# Patient Record
Sex: FEMALE | Race: WHITE | NOT HISPANIC OR LATINO | Employment: FULL TIME | URBAN - METROPOLITAN AREA
[De-identification: names, ages, dates, MRNs, and addresses within clinical notes are randomized per-mention and may not be internally consistent; named-entity substitution may affect disease eponyms.]

---

## 2017-04-18 DIAGNOSIS — N21.1 URETHRAL CALCULUS: Primary | ICD-10-CM

## 2017-04-19 ENCOUNTER — HOSPITAL ENCOUNTER (OUTPATIENT)
Dept: GENERAL RADIOLOGY | Facility: CLINIC | Age: 57
Discharge: HOME | End: 2017-04-19
Attending: UROLOGY | Admitting: UROLOGY
Payer: COMMERCIAL

## 2017-04-19 ENCOUNTER — OFFICE VISIT (OUTPATIENT)
Dept: UROLOGY | Facility: CLINIC | Age: 57
End: 2017-04-19
Payer: COMMERCIAL

## 2017-04-19 VITALS — HEIGHT: 71 IN | HEART RATE: 60 BPM | WEIGHT: 203 LBS | OXYGEN SATURATION: 98 % | BODY MASS INDEX: 28.42 KG/M2

## 2017-04-19 DIAGNOSIS — N21.1 URETHRAL CALCULUS: ICD-10-CM

## 2017-04-19 DIAGNOSIS — N20.1 URETERAL STONE: Primary | ICD-10-CM

## 2017-04-19 LAB
ALBUMIN UR-MCNC: NEGATIVE MG/DL
APPEARANCE UR: CLEAR
BILIRUB UR QL STRIP: NEGATIVE
COLOR UR AUTO: YELLOW
GLUCOSE UR STRIP-MCNC: NEGATIVE MG/DL
HGB UR QL STRIP: NEGATIVE
KETONES UR STRIP-MCNC: NEGATIVE MG/DL
LEUKOCYTE ESTERASE UR QL STRIP: NEGATIVE
NITRATE UR QL: NEGATIVE
PH UR STRIP: 8.5 PH (ref 5–7)
SP GR UR STRIP: 1.01 (ref 1–1.03)
URN SPEC COLLECT METH UR: ABNORMAL
UROBILINOGEN UR STRIP-ACNC: 0.2 EU/DL (ref 0.2–1)

## 2017-04-19 PROCEDURE — 74010 XR KUB: CPT | Mod: 52

## 2017-04-19 PROCEDURE — 99212 OFFICE O/P EST SF 10 MIN: CPT | Performed by: UROLOGY

## 2017-04-19 PROCEDURE — 81003 URINALYSIS AUTO W/O SCOPE: CPT | Performed by: UROLOGY

## 2017-04-19 ASSESSMENT — PAIN SCALES - GENERAL: PAINLEVEL: NO PAIN (0)

## 2017-04-19 NOTE — PROGRESS NOTES
July Wolf is a 56-year-old female with a history of calcium/uric acid stones. She has a normal urinalysis today with pH 8.5, specific gravity 1.015, no blood or leukocytes. She's having no flank pain or hematuria  Creatinine last fall was 1.14 and she's had normal serum calcium levels.  Other past medical history: Hyperlipidemia, hysterectomy, tonsillectomy, nonsmoker  Family history: Diabetes, heart disease, prostate cancer  Urinalysis as above  Exam: Normal appearance, normal vital signs, alert and oriented, normocephalic, normal respirations, neuro grossly intact  Assessment: History of calcium/uric acid urolithiasis-no evidence for recurrence on KUB or symptoms  Plan repeat KUB in 1 year-if clear no further follow-up

## 2017-04-19 NOTE — MR AVS SNAPSHOT
After Visit Summary   4/19/2017    July Wolf    MRN: [id number]           Patient Information     Date Of Birth          1960        Visit Information        Provider Department      4/19/2017 2:00 PM Eduardo Khalil MD [facility]        Today's Diagnoses     Ureteral stone    -  1       Follow-ups after your visit        Follow-up notes from your care team     Return in about 1 year (around 4/19/2018) for KUB.      Future tests that were ordered for you today     Open Future Orders        Priority Expected Expires Ordered    XR KUB [[id number]] Routine 4/19/2018 4/19/2018 4/19/2017    XR KUB [[id number]] Routine 4/18/2017 4/18/2018 4/18/2017            Who to contact     If you have questions or need follow up information about today's clinic visit or your schedule please contact [facility] UROLOGY [facility] directly at [phone].  Normal or non-critical lab and imaging results will be communicated to you by [employer]ell[employer], letter or phone within 4 business days after the clinic has received the results. If you do not hear from us within 7 days, please contact the clinic through [employer] or phone. If you have a critical or abnormal lab result, we will notify you by phone as soon as possible.  Submit refill requests through [employer] or call your pharmacy and they will forward the refill request to us. Please allow 3 business days for your refill to be completed.          Additional Information About Your Visit        MyChart Information     [employer] gives you secure access to your electronic health record. If you see a primary care provider, you can also send messages to your care team and make appointments. If you have questions, please call your primary care clinic.  If you do not have a primary care provider, please call [phone] and they will assist you.        Care EveryWhere ID     This is your Care EveryWhere ID. This  "could be used by other organizations to access your [place] medical records  [id number]        Your Vitals Were     Pulse Height Pulse Oximetry BMI (Body Mass Index)          60 1.803 m (5' 11\") 98% 28.31 kg/m2         Blood Pressure from Last 3 Encounters:   10/19/16 124/84   10/04/16 120/70   09/29/16 111/72    Weight from Last 3 Encounters:   04/19/17 92.1 kg (203 lb)   10/19/16 92.1 kg (203 lb)   10/04/16 92.1 kg (203 lb)              We Performed the Following     UA without Microscopic        Primary Care Provider Office Phone # Fax #    Isha Demarco [phone]       [facility] [address] [place] [address]        Thank you!     Thank you for choosing [facility]  [facility]. Our goal is always to provide you with excellent care. Hearing back from our patients is one way we can continue to improve our services. Please take a few minutes to complete the written survey that you may receive in the mail after your visit with us. Thank you!             Your Updated Medication List - Protect others around you: Learn how to safely use, store and throw away your medicines at www.disposemymeds.org.      Notice  As of 4/19/2017  2:20 PM    You have not been prescribed any medications.      "

## 2017-04-19 NOTE — LETTER
4/19/2017       RE: July Wolf  [address] [place] [address]     Dear Colleague,    Thank you for referring your patient, July Wolf, to the [facility] UROLOGY CL[facility]IC [place] [facility]. Please see a copy of my visit note below.    July Wolf is a 56-year-old female with a history of calcium/uric acid stones. She has a normal urinalysis today with pH 8.5, specific gravity 1.015, no blood or leukocytes. She's having no flank pain or hematuria  Creatinine last fall was 1.14 and she's had normal serum calcium levels.  Other past medical history: Hyperlipidemia, hysterectomy, tonsillectomy, nonsmoker  Family history: Diabetes, heart disease, prostate cancer  Urinalysis as above  Exam: Normal appearance, normal vital signs, alert and oriented, normocephalic, normal respirations, neuro grossly intact  Assessment: History of calcium/uric acid urolithiasis-no evidence for recurrence on KUB or symptoms  Plan repeat KUB in 1 year-if clear no further follow-up      Again, thank you for allowing me to participate in the care of your patient.      Sincerely,    Eduardo Khalil MD

## 2019-09-27 ENCOUNTER — HEALTH MAINTENANCE LETTER (OUTPATIENT)
Age: 59
End: 2019-09-27

## 2019-10-22 ENCOUNTER — HOSPITAL ENCOUNTER (EMERGENCY)
Facility: CLINIC | Age: 59
Discharge: HOME | End: 2019-10-22
Attending: EMERGENCY MEDICINE | Admitting: EMERGENCY MEDICINE
Payer: COMMERCIAL

## 2019-10-22 VITALS
HEART RATE: 71 BPM | RESPIRATION RATE: 18 BRPM | TEMPERATURE: 97.7 F | SYSTOLIC BLOOD PRESSURE: 164 MMHG | DIASTOLIC BLOOD PRESSURE: 89 MMHG | OXYGEN SATURATION: 97 %

## 2019-10-22 DIAGNOSIS — T78.40XA ALLERGIC REACTION, INITIAL ENCOUNTER: ICD-10-CM

## 2019-10-22 PROCEDURE — 96374 THER/PROPH/DIAG INJ IV PUSH: CPT

## 2019-10-22 PROCEDURE — 25000128 H RX IP 250 OP 636: Performed by: EMERGENCY MEDICINE

## 2019-10-22 PROCEDURE — 99284 EMERGENCY DEPT VISIT MOD MDM: CPT | Mod: 25

## 2019-10-22 PROCEDURE — 25000132 ZZH RX MED GY IP 250 OP 250 PS 637: Performed by: EMERGENCY MEDICINE

## 2019-10-22 RX ORDER — ACETAMINOPHEN 325 MG/1
650 TABLET ORAL ONCE
Status: COMPLETED | OUTPATIENT
Start: 2019-10-22 | End: 2019-10-22

## 2019-10-22 RX ORDER — EPINEPHRINE 0.3 MG/.3ML
0.3 INJECTION SUBCUTANEOUS
Qty: 1 EACH | Refills: 0 | Status: ON HOLD | OUTPATIENT
Start: 2019-10-22 | End: 2022-07-27

## 2019-10-22 RX ORDER — DIPHENHYDRAMINE HCL 25 MG
25 TABLET ORAL EVERY 6 HOURS PRN
Qty: 12 TABLET | Refills: 0 | Status: SHIPPED | OUTPATIENT
Start: 2019-10-22 | End: 2022-07-20

## 2019-10-22 RX ORDER — DEXAMETHASONE SODIUM PHOSPHATE 10 MG/ML
10 INJECTION, SOLUTION INTRAMUSCULAR; INTRAVENOUS ONCE
Status: COMPLETED | OUTPATIENT
Start: 2019-10-22 | End: 2019-10-22

## 2019-10-22 RX ADMIN — ACETAMINOPHEN 650 MG: 325 TABLET, FILM COATED ORAL at 13:12

## 2019-10-22 RX ADMIN — DEXAMETHASONE SODIUM PHOSPHATE 10 MG: 10 INJECTION, SOLUTION INTRAMUSCULAR; INTRAVENOUS at 13:12

## 2019-10-22 ASSESSMENT — ENCOUNTER SYMPTOMS
FACIAL SWELLING: 0
SHORTNESS OF BREATH: 0
ROS SKIN COMMENTS: ITCHING
FEVER: 0

## 2019-10-22 NOTE — ED PROVIDER NOTES
"  History     Chief Complaint:  Allergic Reaction    HPI   July Wolf is a 58 year old female with a history of hyperlipidemia who presents to the emergency department today for evaluation of concerns for an allergic reaction. The patient explains that she was nearly finished eating a salad for lunch around 1200 when she developed warmth described as \"more than just a hot flash\" and itchiness to her chest or back. This prompted a call to EMS, who provided 50 mg benadryl IV with marked relief. Here the patient denies any tongue swelling or known new exposures in her salad.     Allergies:  No Known Drug Allergies     Medications:    Medications reviewed. No pertinent medications.    Past Medical History:    Urolithiasis  Occipital mass  Hyperlipidemia    Past Surgical History:    Combined cystoscopy, retrogrades, ureteroscopy, insert stent  Genitourinary surgery  Tonsillectomy  Hysterectomy   section x3    Family History:    Father: prostate cancer  Mother: lipids  Brother: lipids, heart disease, diabetes  Sister: lipids    Social History:  The patient was accompanied to the ED by her daughter.  Smoking Status: Never Smoker  Alcohol Use: Positive  Drug Use: Negative  PCP: Isha Demarco  Marital Status:        Review of Systems   Constitutional: Negative for fever.        Warmth    HENT: Negative for facial swelling.         No tongue swelling   Respiratory: Negative for shortness of breath.    Skin:        Itching    All other systems reviewed and are negative.      Physical Exam     Patient Vitals for the past 24 hrs:   BP Temp Temp src Pulse Resp SpO2   10/22/19 1235 (!) 176/90 97.7  F (36.5  C) Oral -- -- --   10/22/19 1234 -- -- Oral 70 18 98 %     Physical Exam  Nursing note and vitals reviewed.  Constitutional: Well nourished.   Eyes: Conjunctiva normal.  Pupils are equal, round, and reactive to light.   ENT: Nose normal. Mucous membranes pink and moist.    Neck: Normal range of " motion.  CVS: Normal rate, regular rhythm.  Normal heart sounds.  No murmur.  Pulmonary: Lungs clear to auscultation bilaterally. No wheezes/rales/rhonchi.  GI: Abdomen soft. Nontender, nondistended. No rigidity or guarding.    MSK: No calf tenderness or swelling.  Neuro: Alert. Follows simple commands.  Skin: Skin is warm and dry. No rash noted.   Psychiatric: Normal affect.       Emergency Department Course     Interventions:  1312 Decadron 10 mg IV  1312 Tylenol 650 mg Oral    Emergency Department Course:    1232 Nursing notes and vitals reviewed.    1251 I performed an exam of the patient as documented above.     1359 Patient rechecked and updated.      Findings and plan explained to the Patient. Patient discharged home with instructions regarding supportive care, medications, and reasons to return. The importance of close follow-up was reviewed. The patient was prescribed an epipen and benadryl.     Impression & Plan      Medical Decision Making:  July Wolf is a 58 year old female who presents with complaint of concerns for allergic reaction.  The patient has no signs of airway compromise or anaphylaxis.  There are no new exposures to suggest a specific allergen.  The patient was treated with benadryl prior to arrival and given decadron here given concern for allergic response.  She was observed over an hour without signs of worsening clinical status and I believe the patient is safe for discharge home.  I discharged with prescriptions for benadryl, and epipen should she develop signs of anaphylaxis.  Recommended follow-up with PCP in 1-2 days for persistent symptoms and given precautions to return to ED should symptoms worsen/change.      Diagnosis:    ICD-10-CM    1. Allergic reaction, initial encounter T78.40XA      Disposition:   The patient is discharged to home.    Discharge Medications:  New Prescriptions    DIPHENHYDRAMINE (BENADRYL) 25 MG TABLET    Take 1 tablet (25 mg) by mouth every 6 hours as  needed for itching or allergies    EPINEPHRINE (EPIPEN/ADRENACLICK/OR ANY BX GENERIC EQUIV) 0.3 MG/0.3ML INJECTION 2-PACK    Inject 0.3 mLs (0.3 mg) into the muscle once as needed for anaphylaxis     Scribe Disclosure:  I, Lou Eaton, am serving as a scribe at 12:38 PM on 10/22/2019 to document services personally performed by Kaye Benito DO based on my observations and the provider's statements to me.    [facility] EMERGENCY DEPARTMENT       Kaye Benito DO  10/22/19 0315

## 2019-10-22 NOTE — ED TRIAGE NOTES
Patient presents to the ED following an allergic reaction. Patient was eating lunch when suddenly became very flushed and itchy. Denies SOB, airway or mouth involvement. Denies history of previous allergic reactions.

## 2019-10-22 NOTE — ED AVS SNAPSHOT
[facility] Emergency Department  [address]  [facility] [address]  Phone:  [phone]  Fax:  [phone]                                    July Wolf   MRN: [id number]    Department:  [facility] Emergency Department   Date of Visit:  10/22/2019           After Visit Summary Signature Page    I have received my discharge instructions, and my questions have been answered. I have discussed any challenges I see with this plan with the nurse or doctor.    ..........................................................................................................................................  Patient/Patient Representative Signature      ..........................................................................................................................................  Patient Representative Print Name and Relationship to Patient    ..................................................               ................................................  Date                                   Time    ..........................................................................................................................................  Reviewed by Signature/Title    ...................................................              ..............................................  Date                                               Time          22EPIC Rev 08/18

## 2019-11-01 ENCOUNTER — MEDICAL CORRESPONDENCE (OUTPATIENT)
Dept: HEALTH INFORMATION MANAGEMENT | Facility: CLINIC | Age: 59
End: 2019-11-01

## 2019-11-12 ENCOUNTER — HOSPITAL ENCOUNTER (OUTPATIENT)
Dept: CARDIOLOGY | Facility: CLINIC | Age: 59
Discharge: HOME | End: 2019-11-12
Attending: NURSE PRACTITIONER | Admitting: NURSE PRACTITIONER
Payer: COMMERCIAL

## 2019-11-12 DIAGNOSIS — R00.2 PALPITATIONS: ICD-10-CM

## 2019-11-12 DIAGNOSIS — Z82.49 FAMILY HISTORY OF CORONARY ARTERY DISEASE: ICD-10-CM

## 2019-11-12 PROCEDURE — 93018 CV STRESS TEST I&R ONLY: CPT | Performed by: INTERNAL MEDICINE

## 2019-11-12 PROCEDURE — 93321 DOPPLER ECHO F-UP/LMTD STD: CPT | Mod: 26 | Performed by: INTERNAL MEDICINE

## 2019-11-12 PROCEDURE — 93350 STRESS TTE ONLY: CPT | Mod: 26 | Performed by: INTERNAL MEDICINE

## 2019-11-12 PROCEDURE — 93325 DOPPLER ECHO COLOR FLOW MAPG: CPT | Mod: TC

## 2019-11-12 PROCEDURE — 93016 CV STRESS TEST SUPVJ ONLY: CPT | Performed by: INTERNAL MEDICINE

## 2019-11-12 PROCEDURE — 93325 DOPPLER ECHO COLOR FLOW MAPG: CPT | Mod: 26 | Performed by: INTERNAL MEDICINE

## 2021-01-09 ENCOUNTER — HEALTH MAINTENANCE LETTER (OUTPATIENT)
Age: 61
End: 2021-01-09

## 2021-06-30 ENCOUNTER — OFFICE VISIT (OUTPATIENT)
Dept: URGENT CARE | Facility: URGENT CARE | Age: 61
End: 2021-06-30
Payer: COMMERCIAL

## 2021-06-30 ENCOUNTER — ANCILLARY PROCEDURE (OUTPATIENT)
Dept: GENERAL RADIOLOGY | Facility: CLINIC | Age: 61
End: 2021-06-30
Attending: PHYSICIAN ASSISTANT
Payer: COMMERCIAL

## 2021-06-30 VITALS
DIASTOLIC BLOOD PRESSURE: 69 MMHG | OXYGEN SATURATION: 100 % | HEART RATE: 67 BPM | RESPIRATION RATE: 18 BRPM | TEMPERATURE: 98.8 F | SYSTOLIC BLOOD PRESSURE: 128 MMHG

## 2021-06-30 DIAGNOSIS — R10.9 FLANK PAIN: Primary | ICD-10-CM

## 2021-06-30 DIAGNOSIS — R30.0 DYSURIA: ICD-10-CM

## 2021-06-30 LAB
ALBUMIN UR-MCNC: NEGATIVE MG/DL
ANION GAP SERPL CALCULATED.3IONS-SCNC: <1 MMOL/L (ref 3–14)
APPEARANCE UR: CLEAR
BACTERIA #/AREA URNS HPF: ABNORMAL /HPF
BASOPHILS # BLD AUTO: 0 10E9/L (ref 0–0.2)
BASOPHILS NFR BLD AUTO: 0.2 %
BILIRUB UR QL STRIP: NEGATIVE
BUN SERPL-MCNC: 12 MG/DL (ref 7–30)
CALCIUM SERPL-MCNC: 9.6 MG/DL (ref 8.5–10.1)
CHLORIDE SERPL-SCNC: 108 MMOL/L (ref 94–109)
CO2 SERPL-SCNC: 30 MMOL/L (ref 20–32)
COLOR UR AUTO: YELLOW
CREAT SERPL-MCNC: 1 MG/DL (ref 0.52–1.04)
DIFFERENTIAL METHOD BLD: NORMAL
EOSINOPHIL # BLD AUTO: 0.1 10E9/L (ref 0–0.7)
EOSINOPHIL NFR BLD AUTO: 1.4 %
ERYTHROCYTE [DISTWIDTH] IN BLOOD BY AUTOMATED COUNT: 11.9 % (ref 10–15)
GFR SERPL CREATININE-BSD FRML MDRD: 60 ML/MIN/{1.73_M2}
GLUCOSE SERPL-MCNC: 96 MG/DL (ref 70–99)
GLUCOSE UR STRIP-MCNC: NEGATIVE MG/DL
HCT VFR BLD AUTO: 42.8 % (ref 35–47)
HGB BLD-MCNC: 14.3 G/DL (ref 11.7–15.7)
HGB UR QL STRIP: ABNORMAL
KETONES UR STRIP-MCNC: NEGATIVE MG/DL
LEUKOCYTE ESTERASE UR QL STRIP: NEGATIVE
LYMPHOCYTES # BLD AUTO: 0.9 10E9/L (ref 0.8–5.3)
LYMPHOCYTES NFR BLD AUTO: 21.2 %
MCH RBC QN AUTO: 32.2 PG (ref 26.5–33)
MCHC RBC AUTO-ENTMCNC: 33.4 G/DL (ref 31.5–36.5)
MCV RBC AUTO: 96 FL (ref 78–100)
MONOCYTES # BLD AUTO: 0.4 10E9/L (ref 0–1.3)
MONOCYTES NFR BLD AUTO: 8.1 %
NEUTROPHILS # BLD AUTO: 3.1 10E9/L (ref 1.6–8.3)
NEUTROPHILS NFR BLD AUTO: 69.1 %
NITRATE UR QL: NEGATIVE
NON-SQ EPI CELLS #/AREA URNS LPF: ABNORMAL /LPF
PH UR STRIP: 5 PH (ref 5–7)
PLATELET # BLD AUTO: 179 10E9/L (ref 150–450)
POTASSIUM SERPL-SCNC: 4.1 MMOL/L (ref 3.4–5.3)
RBC # BLD AUTO: 4.44 10E12/L (ref 3.8–5.2)
RBC #/AREA URNS AUTO: ABNORMAL /HPF
SODIUM SERPL-SCNC: 138 MMOL/L (ref 133–144)
SOURCE: ABNORMAL
SP GR UR STRIP: >1.03 (ref 1–1.03)
UROBILINOGEN UR STRIP-ACNC: 0.2 EU/DL (ref 0.2–1)
WBC # BLD AUTO: 4.4 10E9/L (ref 4–11)
WBC #/AREA URNS AUTO: ABNORMAL /HPF

## 2021-06-30 PROCEDURE — 36415 COLL VENOUS BLD VENIPUNCTURE: CPT | Performed by: PHYSICIAN ASSISTANT

## 2021-06-30 PROCEDURE — 87086 URINE CULTURE/COLONY COUNT: CPT | Performed by: PHYSICIAN ASSISTANT

## 2021-06-30 PROCEDURE — 85025 COMPLETE CBC W/AUTO DIFF WBC: CPT | Performed by: PHYSICIAN ASSISTANT

## 2021-06-30 PROCEDURE — 99204 OFFICE O/P NEW MOD 45 MIN: CPT | Performed by: PHYSICIAN ASSISTANT

## 2021-06-30 PROCEDURE — 81001 URINALYSIS AUTO W/SCOPE: CPT | Performed by: FAMILY MEDICINE

## 2021-06-30 PROCEDURE — 80048 BASIC METABOLIC PNL TOTAL CA: CPT | Performed by: PHYSICIAN ASSISTANT

## 2021-06-30 PROCEDURE — 74019 RADEX ABDOMEN 2 VIEWS: CPT | Performed by: RADIOLOGY

## 2021-06-30 RX ORDER — TAMSULOSIN HYDROCHLORIDE 0.4 MG/1
0.4 CAPSULE ORAL DAILY
Qty: 10 CAPSULE | Refills: 0 | Status: SHIPPED | OUTPATIENT
Start: 2021-06-30 | End: 2022-07-20

## 2021-06-30 NOTE — PROGRESS NOTES
Assessment & Plan     1. Flank pain  60-year-old female with history of flank pain radiating to her groin. On exam, She looks well.  She is not tenderness over right or left flank.  UA is unremarkable.  CBC normal.  She does not have elevated creatinine.   XR shoes possible right uretal stone. Will treat empirically with flomax, encouraged her to strain urine.   RED flag symptoms for follow-up discussed with patient.   - UA reflex to Microscopic and Culture  - Urine Microscopic  - CBC with platelets and differential  - Basic metabolic panel  (Ca, Cl, CO2, Creat, Gluc, K, Na, BUN)  - XR Abdomen 2 Views; Future  - tamsulosin (FLOMAX) 0.4 MG capsule; Take 1 capsule (0.4 mg) by mouth daily  Dispense: 10 capsule; Refill: 0        Return in about 1 week (around 7/7/2021), or if symptoms worsen or fail to improve.    FLACO Plata [facility]RG[facility]    CHIEF COMPLAINT:   Chief Complaint   Patient presents with     Urgent Care     Musculoskeletal Problem     Back pain concerns about kidney stone      Subjective     July is a 60 year old female who presents to clinic today for evaluation of right flank pain. Symptoms started about one week ago. Pain radiates into the right side of her groin. Pain is mostly constant. Today endorses having some nausea. She denies having fever, chills, vomiting, hematuria, dysuria, rash or weakness. No new activities. She has a history of kidney stones in the past.       Past Medical History:   Diagnosis Date     Hyperlipidemia LDL goal <130     not on meds     Past Surgical History:   Procedure Laterality Date     COLONOSCOPY  3/20/2014    Procedure: COLONOSCOPY;  Colonoscopy;  Surgeon: Satya Augustin MD;  Location:  GI     COMBINED CYSTOSCOPY, RETROGRADES, URETEROSCOPY, INSERT STENT Left 9/22/2016    Procedure: COMBINED CYSTOSCOPY, RETROGRADES, URETEROSCOPY, INSERT STENT;  Surgeon: Eduardo Khalil MD;  Location:  OR     GENITOURINARY SURGERY        HEAD & NECK SURGERY      tonsillectomy     HYSTERECTOMY, PAP NO LONGER INDICATED  1991    endometriosis, 3 c sections     Social History     Tobacco Use     Smoking status: Never Smoker   Substance Use Topics     Alcohol use: Yes     Comment: 3 drinks weekly     Current Outpatient Medications   Medication     diphenhydrAMINE (BENADRYL) 25 MG tablet     EPINEPHrine (EPIPEN/ADRENACLICK/OR ANY BX GENERIC EQUIV) 0.3 MG/0.3ML injection 2-pack     tamsulosin (FLOMAX) 0.4 MG capsule     No current facility-administered medications for this visit.      No Known Allergies    10 point ROS of systems were all negative except for pertinent positives noted in my HPI.      Exam:   /69   Pulse 67   Temp 98.8  F (37.1  C) (Tympanic)   Resp 18   SpO2 100%   Constitutional: healthy, alert and no distress  Head: Normocephalic, atraumatic.  Eyes: conjunctiva clear, no drainage  ENT: MMM  Neck: neck is supple, no cervical lymphadenopathy or nuchal rigidity  Cardiovascular: RRR  Respiratory: CTA bilaterally, no rhonchi or rales  Gastrointestinal: soft and nontender. No rebound, guarding or rigidity.  BACK: NO CVA tenderness. No paraspinal muscle tenderness.   Skin: no rashes  Neurologic: Speech clear, gait normal. Moves all extremities.    Results for orders placed or performed in visit on 06/30/21   XR Abdomen 2 Views     Status: None (Preliminary result)    Narrative    ABDOMEN TWO VIEWS 6/30/2021 11:53 AM     HISTORY: Right-sided flank pain, stone. Dysuria.    COMPARISON: KUB 4/19/2017.       Impression    IMPRESSION: Flat and upright views of the abdomen. Bowel gas pattern  is unremarkable. No free intraperitoneal air. Bowel staples and  calcified phleboliths in the pelvis are unchanged. No definite calculi  overlying the renal shadows. There is a possible stone overlying the  superior right sacrum possibly in the right ureter. Follow-up CT  abdomen and pelvis without contrast as needed to confirm right  ureteral stone.    Results for orders placed or performed in visit on 06/30/21   UA reflex to Microscopic and Culture     Status: Abnormal    Specimen: Midstream Urine   Result Value Ref Range    Color Urine Yellow     Appearance Urine Clear     Glucose Urine Negative NEG^Negative mg/dL    Bilirubin Urine Negative NEG^Negative    Ketones Urine Negative NEG^Negative mg/dL    Specific Gravity Urine >1.030 1.003 - 1.035    Blood Urine Small (A) NEG^Negative    pH Urine 5.0 5.0 - 7.0 pH    Protein Albumin Urine Negative NEG^Negative mg/dL    Urobilinogen Urine 0.2 0.2 - 1.0 EU/dL    Nitrite Urine Negative NEG^Negative    Leukocyte Esterase Urine Negative NEG^Negative    Source Midstream Urine    Urine Microscopic     Status: Abnormal   Result Value Ref Range    WBC Urine 0 - 5 OTO5^0 - 5 /HPF    RBC Urine O - 2 OTO2^O - 2 /HPF    Squamous Epithelial /LPF Urine Few FEW^Few /LPF    Bacteria Urine Few (A) NEG^Negative /HPF   CBC with platelets and differential     Status: None   Result Value Ref Range    WBC 4.4 4.0 - 11.0 10e9/L    RBC Count 4.44 3.8 - 5.2 10e12/L    Hemoglobin 14.3 11.7 - 15.7 g/dL    Hematocrit 42.8 35.0 - 47.0 %    MCV 96 78 - 100 fl    MCH 32.2 26.5 - 33.0 pg    MCHC 33.4 31.5 - 36.5 g/dL    RDW 11.9 10.0 - 15.0 %    Platelet Count 179 150 - 450 10e9/L    % Neutrophils 69.1 %    % Lymphocytes 21.2 %    % Monocytes 8.1 %    % Eosinophils 1.4 %    % Basophils 0.2 %    Absolute Neutrophil 3.1 1.6 - 8.3 10e9/L    Absolute Lymphocytes 0.9 0.8 - 5.3 10e9/L    Absolute Monocytes 0.4 0.0 - 1.3 10e9/L    Absolute Eosinophils 0.1 0.0 - 0.7 10e9/L    Absolute Basophils 0.0 0.0 - 0.2 10e9/L    Diff Method Automated Method    Basic metabolic panel  (Ca, Cl, CO2, Creat, Gluc, K, Na, BUN)     Status: Abnormal   Result Value Ref Range    Sodium 138 133 - 144 mmol/L    Potassium 4.1 3.4 - 5.3 mmol/L    Chloride 108 94 - 109 mmol/L    Carbon Dioxide 30 20 - 32 mmol/L    Anion Gap <1 (L) 3 - 14 mmol/L    Glucose 96 70 - 99 mg/dL    Urea  Nitrogen 12 7 - 30 mg/dL    Creatinine 1.00 0.52 - 1.04 mg/dL    GFR Estimate 60 (L) >60 mL/min/[1.73_m2]    GFR Estimate If Black 69 >60 mL/min/[1.73_m2]    Calcium 9.6 8.5 - 10.1 mg/dL

## 2021-06-30 NOTE — PATIENT INSTRUCTIONS
We will treat you today for suspected kidney stones.   I want you to push fluids and rest  Strain your urine and examine for stones  If fever, vomiting and unable to hold down fluids or severe pain, follow-up in ER    Patient Education     Understanding Kidney Stones  Your kidneys are bean-shaped organs. They help filter extra salts, waste, and water from your body. You need to drink enough water every day to help flush the extra salts into your urine. Aim for 6 to 8 8-ounce cups every day.   What are kidney stones?  Kidney stones are made up of chemical crystals that separate out from urine. These crystals clump together to make stones. They may stay in the kidney or move into the urinary tract.    Why kidney stones form  Kidneys form stones for many reasons. If you don t drink enough water, for instance, you won t have enough urine to dilute chemicals. Then the chemicals may form crystals, which can develop into stones. Here are some reasons why kidney stones form:     Fluid loss (dehydration). This can concentrate urine, causing stones to form.    Certain foods. Some foods contain large amounts of the chemicals that sometimes crystallize into stones. Eating foods that contain a lot of meat or salt can lead to more kidney stones.    Kidney infections. These infections foster stones by slowing urine flow or changing the acid balance of your urine.    Family history. If family members have had kidney stones, you re more likely to have them, too.    A lack of certain substances in your urine. Some substances can help protect you from forming stones. If you don t have enough of these in your urine, stone formation can increase.  Where stones form  Stones begin in the cup-shaped part of the kidney (calyx). Some stay in the calyx and grow. Others move into the kidney, pelvis, or into the ureter. There they can lodge, block the flow of urine, and cause pain.     Symptoms  Many stones cause sudden and severe pain  and bloody urine. Others cause nausea or frequent, burning urination. Symptoms often depend on your stone s size and location. Fever may indicate a serious infection. Call your healthcare provider right away if you develop a fever.   [employer] last reviewed this educational content on 2/1/2020 2000-2021 [employer]. All rights reserved. This information is not intended as a substitute for professional medical care. Always follow your healthcare professional's instructions.

## 2021-07-01 LAB
BACTERIA SPEC CULT: NO GROWTH
Lab: NORMAL
SPECIMEN SOURCE: NORMAL

## 2021-07-03 ENCOUNTER — HOSPITAL ENCOUNTER (EMERGENCY)
Facility: CLINIC | Age: 61
Discharge: HOME | End: 2021-07-03
Attending: PHYSICIAN ASSISTANT | Admitting: PHYSICIAN ASSISTANT
Payer: COMMERCIAL

## 2021-07-03 ENCOUNTER — APPOINTMENT (OUTPATIENT)
Dept: CT IMAGING | Facility: CLINIC | Age: 61
End: 2021-07-03
Attending: PHYSICIAN ASSISTANT
Payer: COMMERCIAL

## 2021-07-03 VITALS
SYSTOLIC BLOOD PRESSURE: 111 MMHG | OXYGEN SATURATION: 96 % | WEIGHT: 201 LBS | TEMPERATURE: 97.5 F | BODY MASS INDEX: 28.03 KG/M2 | RESPIRATION RATE: 18 BRPM | HEART RATE: 50 BPM | DIASTOLIC BLOOD PRESSURE: 69 MMHG

## 2021-07-03 DIAGNOSIS — B02.9 HERPES ZOSTER WITHOUT COMPLICATION: ICD-10-CM

## 2021-07-03 DIAGNOSIS — B02.9 SHINGLES: ICD-10-CM

## 2021-07-03 LAB
ALBUMIN SERPL-MCNC: 4.1 G/DL (ref 3.4–5)
ALBUMIN UR-MCNC: NEGATIVE MG/DL
ALP SERPL-CCNC: 61 U/L (ref 40–150)
ALT SERPL W P-5'-P-CCNC: 29 U/L (ref 0–50)
ANION GAP SERPL CALCULATED.3IONS-SCNC: 5 MMOL/L (ref 3–14)
APPEARANCE UR: CLEAR
AST SERPL W P-5'-P-CCNC: 16 U/L (ref 0–45)
BASOPHILS # BLD AUTO: 0 10E9/L (ref 0–0.2)
BASOPHILS NFR BLD AUTO: 0.5 %
BILIRUB DIRECT SERPL-MCNC: 0.1 MG/DL (ref 0–0.2)
BILIRUB SERPL-MCNC: 0.9 MG/DL (ref 0.2–1.3)
BILIRUB UR QL STRIP: NEGATIVE
BUN SERPL-MCNC: 15 MG/DL (ref 7–30)
CALCIUM SERPL-MCNC: 9.3 MG/DL (ref 8.5–10.1)
CHLORIDE SERPL-SCNC: 107 MMOL/L (ref 94–109)
CO2 SERPL-SCNC: 29 MMOL/L (ref 20–32)
COLOR UR AUTO: YELLOW
CREAT SERPL-MCNC: 0.95 MG/DL (ref 0.52–1.04)
DIFFERENTIAL METHOD BLD: NORMAL
EOSINOPHIL # BLD AUTO: 0.1 10E9/L (ref 0–0.7)
EOSINOPHIL NFR BLD AUTO: 0.8 %
ERYTHROCYTE [DISTWIDTH] IN BLOOD BY AUTOMATED COUNT: 11.8 % (ref 10–15)
GFR SERPL CREATININE-BSD FRML MDRD: 65 ML/MIN/{1.73_M2}
GLUCOSE SERPL-MCNC: 97 MG/DL (ref 70–99)
GLUCOSE UR STRIP-MCNC: NEGATIVE MG/DL
HCT VFR BLD AUTO: 44.5 % (ref 35–47)
HGB BLD-MCNC: 15.1 G/DL (ref 11.7–15.7)
HGB UR QL STRIP: ABNORMAL
IMM GRANULOCYTES # BLD: 0 10E9/L (ref 0–0.4)
IMM GRANULOCYTES NFR BLD: 0.3 %
KETONES UR STRIP-MCNC: NEGATIVE MG/DL
LEUKOCYTE ESTERASE UR QL STRIP: NEGATIVE
LIPASE SERPL-CCNC: 121 U/L (ref 73–393)
LYMPHOCYTES # BLD AUTO: 1.7 10E9/L (ref 0.8–5.3)
LYMPHOCYTES NFR BLD AUTO: 28.7 %
MCH RBC QN AUTO: 32.5 PG (ref 26.5–33)
MCHC RBC AUTO-ENTMCNC: 33.9 G/DL (ref 31.5–36.5)
MCV RBC AUTO: 96 FL (ref 78–100)
MONOCYTES # BLD AUTO: 0.4 10E9/L (ref 0–1.3)
MONOCYTES NFR BLD AUTO: 7.2 %
MUCOUS THREADS #/AREA URNS LPF: PRESENT /LPF
NEUTROPHILS # BLD AUTO: 3.7 10E9/L (ref 1.6–8.3)
NEUTROPHILS NFR BLD AUTO: 62.5 %
NITRATE UR QL: NEGATIVE
NRBC # BLD AUTO: 0 10*3/UL
NRBC BLD AUTO-RTO: 0 /100
PH UR STRIP: 5.5 PH (ref 5–7)
PLATELET # BLD AUTO: 210 10E9/L (ref 150–450)
POTASSIUM SERPL-SCNC: 3.9 MMOL/L (ref 3.4–5.3)
PROT SERPL-MCNC: 7.8 G/DL (ref 6.8–8.8)
RBC # BLD AUTO: 4.65 10E12/L (ref 3.8–5.2)
RBC #/AREA URNS AUTO: 2 /HPF (ref 0–2)
SODIUM SERPL-SCNC: 141 MMOL/L (ref 133–144)
SOURCE: ABNORMAL
SP GR UR STRIP: 1.02 (ref 1–1.03)
SQUAMOUS #/AREA URNS AUTO: <1 /HPF (ref 0–1)
UROBILINOGEN UR STRIP-MCNC: NORMAL MG/DL (ref 0–2)
WBC # BLD AUTO: 6 10E9/L (ref 4–11)
WBC #/AREA URNS AUTO: 1 /HPF (ref 0–5)

## 2021-07-03 PROCEDURE — 81001 URINALYSIS AUTO W/SCOPE: CPT | Performed by: PHYSICIAN ASSISTANT

## 2021-07-03 PROCEDURE — 74177 CT ABD & PELVIS W/CONTRAST: CPT | Mod: 59

## 2021-07-03 PROCEDURE — 250N000011 HC RX IP 250 OP 636: Performed by: PHYSICIAN ASSISTANT

## 2021-07-03 PROCEDURE — 80048 BASIC METABOLIC PNL TOTAL CA: CPT | Performed by: PHYSICIAN ASSISTANT

## 2021-07-03 PROCEDURE — 85025 COMPLETE CBC W/AUTO DIFF WBC: CPT | Performed by: PHYSICIAN ASSISTANT

## 2021-07-03 PROCEDURE — 96374 THER/PROPH/DIAG INJ IV PUSH: CPT

## 2021-07-03 PROCEDURE — 99285 EMERGENCY DEPT VISIT HI MDM: CPT | Mod: 25

## 2021-07-03 PROCEDURE — 80076 HEPATIC FUNCTION PANEL: CPT | Performed by: PHYSICIAN ASSISTANT

## 2021-07-03 PROCEDURE — 96375 TX/PRO/DX INJ NEW DRUG ADDON: CPT

## 2021-07-03 PROCEDURE — 83690 ASSAY OF LIPASE: CPT | Performed by: PHYSICIAN ASSISTANT

## 2021-07-03 RX ORDER — OXYCODONE HYDROCHLORIDE 5 MG/1
5 TABLET ORAL EVERY 6 HOURS PRN
Qty: 12 TABLET | Refills: 0 | Status: SHIPPED | OUTPATIENT
Start: 2021-07-03 | End: 2022-07-20

## 2021-07-03 RX ORDER — ONDANSETRON 2 MG/ML
4 INJECTION INTRAMUSCULAR; INTRAVENOUS EVERY 30 MIN PRN
Status: DISCONTINUED | OUTPATIENT
Start: 2021-07-03 | End: 2021-07-03 | Stop reason: HOSPADM

## 2021-07-03 RX ORDER — HYDROMORPHONE HYDROCHLORIDE 1 MG/ML
0.5 INJECTION, SOLUTION INTRAMUSCULAR; INTRAVENOUS; SUBCUTANEOUS
Status: DISCONTINUED | OUTPATIENT
Start: 2021-07-03 | End: 2021-07-03 | Stop reason: HOSPADM

## 2021-07-03 RX ORDER — VALACYCLOVIR HYDROCHLORIDE 1 G/1
1000 TABLET, FILM COATED ORAL 3 TIMES DAILY
Qty: 21 TABLET | Refills: 0 | Status: SHIPPED | OUTPATIENT
Start: 2021-07-03 | End: 2022-07-20

## 2021-07-03 RX ORDER — IOPAMIDOL 755 MG/ML
100 INJECTION, SOLUTION INTRAVASCULAR ONCE
Status: COMPLETED | OUTPATIENT
Start: 2021-07-03 | End: 2021-07-03

## 2021-07-03 RX ADMIN — IOPAMIDOL 100 ML: 755 INJECTION, SOLUTION INTRAVENOUS at 15:01

## 2021-07-03 RX ADMIN — ONDANSETRON 4 MG: 2 INJECTION INTRAMUSCULAR; INTRAVENOUS at 14:14

## 2021-07-03 RX ADMIN — HYDROMORPHONE HYDROCHLORIDE 0.5 MG: 1 INJECTION, SOLUTION INTRAMUSCULAR; INTRAVENOUS; SUBCUTANEOUS at 14:18

## 2021-07-03 ASSESSMENT — ENCOUNTER SYMPTOMS
DIARRHEA: 1
SHORTNESS OF BREATH: 0
HEMATURIA: 0
HEADACHES: 1
ABDOMINAL PAIN: 1
FEVER: 0
FLANK PAIN: 1
ABDOMINAL DISTENTION: 1
DIFFICULTY URINATING: 0
VOMITING: 0
DYSURIA: 0
CHILLS: 0

## 2021-07-03 NOTE — ED NOTES
Patient discharged home with discharge paperwork and 2 printed prescriptions. Vital signs stable at discharge. Education provided regarding medication use, follow up with PCP. Pt verbalized understanding. IV removed. Catheter intact. All questions answered.

## 2021-07-03 NOTE — ED TRIAGE NOTES
A&O x4, ABCs intact. Pt presents with right flank pain that started this past Monday. Pt was seen at urgent care where they did a UA and abdominal xray. Pt reports that the pain has become worse. Its on the right abdomen and right back. Pt is taking flomax for possible stone.

## 2021-07-03 NOTE — ED PROVIDER NOTES
History   Chief Complaint:  Flank Pain       HPI   July Wolf is a 60 year old female with history of kidney stones and hyperlipidemia who presents with right flank pain. The patient reports onset of right flank pain on Monday. She states the pain worsened on Wednesday leading her to go to  where they ran urine and blood work and sent her home. She states that the pain radiates into her RLQ abdomen at times also. In the ED she states the pain gets up to an 8/10 severity and that her RLQ is tender to palpation. She also notes that she has a rash on her right back that is tender. She notes that she had a headache yesterday but took acetaminophen with relief. She also notes having muscle spasms since Monday and loose stool on Monday as well, but has not gone since. She notes associated abdominal distention as well. She denies vomiting, fever, chills, chest pain, shortness of breath, dysuria, hematuria or difficulty going. She notes still having her appendix.      Review of Systems   Constitutional: Negative for chills and fever.   Respiratory: Negative for shortness of breath.    Cardiovascular: Negative for chest pain.   Gastrointestinal: Positive for abdominal distention, abdominal pain and diarrhea (Loose stool). Negative for vomiting.   Genitourinary: Positive for flank pain. Negative for difficulty urinating, dysuria and hematuria.   Skin: Positive for rash.   Neurological: Positive for headaches.   All other systems reviewed and are negative.    Allergies:  The patient has no known allergies.     Medications:  Flomax  Benadryl    Past Medical History:    Hyperlipidemia  Urolithiasis  Occipital mass  Kidney stones  Varicella  Ankylosing spondylitis     Past Surgical History:    Colonoscopy   Combined cystoscopy, retrogrades, ureteroscopy, insert stent  Genitourinary surgery  Tonsillectomy  Hysterectomy  Dilation & Curet DX   x 3    Family History:    Prostate cancer  Hyperlipidemia     Social  History:  The patient presents alone.  The patient notes going to Urgent care.    Physical Exam     Patient Vitals for the past 24 hrs:   BP Temp Temp src Pulse Resp SpO2 Weight   07/03/21 1515 120/77 -- -- 58 -- 96 % --   07/03/21 1445 116/71 -- -- 54 -- 95 % --   07/03/21 1430 117/74 -- -- 59 -- 94 % --   07/03/21 1415 122/88 -- -- -- -- 98 % --   07/03/21 1400 -- -- -- -- -- 99 % --   07/03/21 1304 (!) 134/93 97.5  F (36.4  C) Temporal 69 18 98 % 91.2 kg (201 lb)       Physical Exam  Constitutional: Pleasant. Cooperative.   Eyes: Pupils equally round and reactive  HENT: Head is normal in appearance. Oropharynx is normal with moist mucus membranes.  Cardiovascular: Regular rate and rhythm and without murmurs.  Respiratory: Normal respiratory effort, lungs are clear bilaterally.  GI: Mild TTP to RLQ, otherwise non-tender, soft, non-distended. No guarding, rebound, or rigidity.  Musculoskeletal: No asymmetry of the lower extremities, no tenderness to palpation. No CVA TTP.  Skin: Erythematous papulopustular rash noted to R lower back. No rash elsewhere, including stomach.  Neurologic: Cranial nerves grossly intact, normal cognition, no focal deficits. Alert and oriented x 3.   Psychiatric: Normal affect.  Nursing notes and vital signs reviewed.    Emergency Department Course      Imaging:  CT Abdomen Pelvis w/ IV contrast:   No acute process demonstrated, as per radiology.      Laboratory:  CBC: WBC: 6.0, HGB: 15.1, PLT: 210  BMP:  o/w WNL (Creatinine: 0.95)    Hepatic Panel: o/w Negative  Lipase: 121     UA: Blood: Trace, Mucous: Present, o/w Negative      Emergency Department Course:    Reviewed:  I reviewed nursing notes, vitals, past medical history and care everywhere    Assessments:  1356 I obtained history and examined the patient as noted above.   1550 I rechecked the patient and explained findings.       Interventions:  1414: Zofran, 4 mg, IV   1418: Dilaudid, 0.5 mg, IV    Disposition:  The patient was  discharged to home.       Impression & Plan     Medical Decision Making:  July Wolf is a 60 year old female who presents to the ED for evaluation of right sided flank and RLQ abdominal pain.  See HPI as above for additional details.  Vitals and physical exam as above.  Differential was broad and included appendicitis, kidney stone, diverticulitis, hernia,  pathology, perforated viscus, pyelonephritis, shingles, among others.  Work-up obtained as above.  Patient does have lesions to the right side of her lower back in the area of her pain.  She does note a burning sensation to her right low back as well as her RLQ.  I suspect shingles at this time based upon remainder of her work-up being reassuring.  Will provide her with the below interventions, advised her to follow-up closely with her PCP.  Kamrar patient was safe for discharge to home. Discussed reasons to return. All questions answered. Patient discharged to home in stable condition.    Diagnosis:    ICD-10-CM    1. Shingles  B02.9        Discharge Medications:  New Prescriptions    OXYCODONE (ROXICODONE) 5 MG TABLET    Take 1 tablet (5 mg) by mouth every 6 hours as needed for pain    VALACYCLOVIR (VALTREX) 1000 MG TABLET    Take 1 tablet (1,000 mg) by mouth 3 times daily for 7 days       Scribe Disclosure:  I, Kai Walker, am serving as a scribe at 1:56 PM on 7/3/2021 to document services personally performed by Marcial Goodson PA-C based on my observations and the provider's statements to me.     This record was created at least in part using electronic voice recognition software, so please excuse any typographical errors.         Marcial Goodson PA-C  07/03/21 2373

## 2021-07-03 NOTE — DISCHARGE INSTRUCTIONS
Take full course of Valtrex.    For pain, you can take up to 1000 mg or 1 g of Tylenol.  You can take 600 mg of ibuprofen at one time.  You can alternate these medications every 3 hours.  Always take ibuprofen with food.  Never take more than 4 g (4000 mg) of Tylenol or 3200mg of ibuprofen in one day.  Do not take this amount for more than 1 week at a time.     Use oxycodone for breakthrough pain. This is sedating, do not drive after taking.

## 2021-10-23 ENCOUNTER — HEALTH MAINTENANCE LETTER (OUTPATIENT)
Age: 61
End: 2021-10-23

## 2022-02-12 ENCOUNTER — HEALTH MAINTENANCE LETTER (OUTPATIENT)
Age: 62
End: 2022-02-12

## 2022-07-20 ENCOUNTER — TELEPHONE (OUTPATIENT)
Dept: UROLOGY | Facility: CLINIC | Age: 62
End: 2022-07-20

## 2022-07-20 ENCOUNTER — OFFICE VISIT (OUTPATIENT)
Dept: URGENT CARE | Facility: URGENT CARE | Age: 62
End: 2022-07-20
Payer: COMMERCIAL

## 2022-07-20 VITALS
HEART RATE: 78 BPM | RESPIRATION RATE: 20 BRPM | TEMPERATURE: 98 F | SYSTOLIC BLOOD PRESSURE: 130 MMHG | DIASTOLIC BLOOD PRESSURE: 78 MMHG | OXYGEN SATURATION: 98 %

## 2022-07-20 DIAGNOSIS — R31.0 GROSS HEMATURIA: Primary | ICD-10-CM

## 2022-07-20 LAB
ALBUMIN UR-MCNC: 30 MG/DL
APPEARANCE UR: CLEAR
BACTERIA #/AREA URNS HPF: ABNORMAL /HPF
BILIRUB UR QL STRIP: NEGATIVE
COLOR UR AUTO: YELLOW
GLUCOSE UR STRIP-MCNC: NEGATIVE MG/DL
HGB UR QL STRIP: ABNORMAL
KETONES UR STRIP-MCNC: NEGATIVE MG/DL
LEUKOCYTE ESTERASE UR QL STRIP: NEGATIVE
NITRATE UR QL: NEGATIVE
PH UR STRIP: 6 [PH] (ref 5–7)
RBC #/AREA URNS AUTO: >100 /HPF
SP GR UR STRIP: 1.01 (ref 1–1.03)
SQUAMOUS #/AREA URNS AUTO: ABNORMAL /LPF
UROBILINOGEN UR STRIP-ACNC: 0.2 E.U./DL
WBC #/AREA URNS AUTO: ABNORMAL /HPF

## 2022-07-20 PROCEDURE — 87086 URINE CULTURE/COLONY COUNT: CPT | Performed by: PHYSICIAN ASSISTANT

## 2022-07-20 PROCEDURE — 81001 URINALYSIS AUTO W/SCOPE: CPT | Performed by: PHYSICIAN ASSISTANT

## 2022-07-20 PROCEDURE — 99214 OFFICE O/P EST MOD 30 MIN: CPT | Performed by: PHYSICIAN ASSISTANT

## 2022-07-20 RX ORDER — TAMSULOSIN HYDROCHLORIDE 0.4 MG/1
0.4 CAPSULE ORAL DAILY
Qty: 12 CAPSULE | Refills: 0 | Status: SHIPPED | OUTPATIENT
Start: 2022-07-20 | End: 2022-09-28

## 2022-07-20 NOTE — TELEPHONE ENCOUNTER
[facility] Call Center    Phone Message    May a detailed message be left on voicemail: yes     Reason for Call: Other: Patient is being referred to urology for Gross Hematuria. Per guidelines send TE.      Action Taken: Message routed to:  [facility] ([facility] Urology     Travel Screening: Not Applicable

## 2022-07-20 NOTE — PROGRESS NOTES
Assessment & Plan     1. Gross hematuria  61-year-old female presents the clinic for evaluation of hematuria for the last couple of days.  On exam, she appears well.  Vital signs are stable.  Urinalysis with no evidence of leukocytes or nitrates.  0-5 white blood cells on microscopic.  With greater than 100 red blood cells.  Etiology of hematuria is not completely clear, patient has a history of kidney stones, suspect that this may be the source.  Will trial Flomax. urine culture is pending to ensure infection is not present.  She was given a strainer to strain urine.  Referral to urology to follow-up with for further evaluation and to hematuria.  Discussed emergent return precautions including fever, chills, severe abdominal or flank pain, vomiting, bleeding clots etc. to follow-up in the emergency department.  - UA reflex to Microscopic and Culture  - Urine Microscopic  - Urine Culture Aerobic Bacterial - lab collect; Future  - Urine Culture Aerobic Bacterial - lab collect  - Adult Urology  Referral; Future  - tamsulosin (FLOMAX) 0.4 MG capsule; Take 1 capsule (0.4 mg) by mouth daily  Dispense: 12 capsule; Refill: 0      Return in about 3 days (around 7/23/2022) for Urology.    Diagnosis and treatment plan was reviewed with patient and/or family.   We went over any labs or imaging. Discussed worsening symptoms or little to no relief despite treatment plan to follow-up with PCP or return to clinic.  Patient verbalizes understanding. All questions were addressed and answered.     FLACO [facility]    CHIEF COMPLAINT:   Chief Complaint   Patient presents with     UTI     Blood in urine      Subjective     July is a 61 year old female who presents to clinic today for evaluation of blood in urine.  Symptoms started several days ago, but she has really noticed it today.  Coming from the urinary tract, no vaginal bleeding.  She has not had fever, chills, flank pain,  abdominal pain, vomiting, dysuria or urinary frequency.  She has history of kidney stones.  Denies weight loss.      Past Medical History:   Diagnosis Date     Hyperlipidemia LDL goal <130     not on meds     Past Surgical History:   Procedure Laterality Date     COLONOSCOPY  3/20/2014    Procedure: COLONOSCOPY;  Colonoscopy;  Surgeon: Satya Augustin MD;  Location: [facility]     COMBINED CYSTOSCOPY, RETROGRADES, URETEROSCOPY, INSERT STENT Left 9/22/2016    Procedure: COMBINED CYSTOSCOPY, RETROGRADES, URETEROSCOPY, INSERT STENT;  Surgeon: Eduardo Khalil MD;  Location: [facility] OR     GENITOURINARY SURGERY       HEAD & NECK SURGERY      tonsillectomy     HYSTERECTOMY, PAP NO LONGER INDICATED  1991    endometriosis, 3 c sections     Social History     Tobacco Use     Smoking status: Never Smoker     Smokeless tobacco: Not on file   Substance Use Topics     Alcohol use: Yes     Comment: 3 drinks weekly     Current Outpatient Medications   Medication     EPINEPHrine (EPIPEN/ADRENACLICK/OR ANY BX GENERIC EQUIV) 0.3 MG/0.3ML injection 2-pack     tamsulosin (FLOMAX) 0.4 MG capsule     magnesium hydroxide (MOM) 2400 MG/10ML SUSP     No current facility-administered medications for this visit.     No Known Allergies    10 point ROS of systems were all negative except for pertinent positives noted in my HPI.      Exam:   /78   Pulse 78   Temp 98  F (36.7  C) (Tympanic)   Resp 20   SpO2 98%   Constitutional: healthy, alert and no distress  Head: Normocephalic, atraumatic.  Eyes: conjunctiva clear, no drainage  ENT: Throat clear. MMM.  Neck: neck is supple, no cervical lymphadenopathy or nuchal rigidity  Cardiovascular: RRR  Respiratory: CTA bilaterally, no rhonchi or rales  Gastrointestinal: soft and nontender  BACK: Neg CTA tenderness B/L  Skin: no rashes  Neurologic: Speech clear, gait normal. Moves all extremities.    Results for orders placed or performed in visit on 07/20/22   UA reflex to Microscopic and  Culture     Status: Abnormal    Specimen: Urine, Midstream   Result Value Ref Range    Color Urine Yellow Colorless, Straw, Light Yellow, Yellow    Appearance Urine Clear Clear    Glucose Urine Negative Negative mg/dL    Bilirubin Urine Negative Negative    Ketones Urine Negative Negative mg/dL    Specific Gravity Urine 1.010 1.003 - 1.035    Blood Urine Large (A) Negative    pH Urine 6.0 5.0 - 7.0    Protein Albumin Urine 30  (A) Negative mg/dL    Urobilinogen Urine 0.2 0.2, 1.0 E.U./dL    Nitrite Urine Negative Negative    Leukocyte Esterase Urine Negative Negative   Urine Microscopic     Status: Abnormal   Result Value Ref Range    Bacteria Urine Few (A) None Seen /HPF    RBC Urine >100 (A) 0-2 /HPF /HPF    WBC Urine 0-5 0-5 /HPF /HPF    Squamous Epithelials Urine Few (A) None Seen /LPF    Narrative    Urine Culture not indicated

## 2022-07-21 LAB — BACTERIA UR CULT: NO GROWTH

## 2022-07-27 ENCOUNTER — APPOINTMENT (OUTPATIENT)
Dept: GENERAL RADIOLOGY | Facility: CLINIC | Age: 62
End: 2022-07-27
Attending: UROLOGY
Payer: COMMERCIAL

## 2022-07-27 ENCOUNTER — ANESTHESIA EVENT (OUTPATIENT)
Dept: SURGERY | Facility: CLINIC | Age: 62
End: 2022-07-27
Payer: COMMERCIAL

## 2022-07-27 ENCOUNTER — HOSPITAL ENCOUNTER (OUTPATIENT)
Facility: CLINIC | Age: 62
Setting detail: OBSERVATION
Discharge: HOME | End: 2022-07-27
Attending: EMERGENCY MEDICINE | Admitting: STUDENT IN AN ORGANIZED HEALTH CARE EDUCATION/TRAINING PROGRAM
Payer: COMMERCIAL

## 2022-07-27 ENCOUNTER — ANESTHESIA (OUTPATIENT)
Dept: SURGERY | Facility: CLINIC | Age: 62
End: 2022-07-27
Payer: COMMERCIAL

## 2022-07-27 ENCOUNTER — APPOINTMENT (OUTPATIENT)
Dept: CT IMAGING | Facility: CLINIC | Age: 62
End: 2022-07-27
Attending: EMERGENCY MEDICINE
Payer: COMMERCIAL

## 2022-07-27 ENCOUNTER — APPOINTMENT (OUTPATIENT)
Dept: ULTRASOUND IMAGING | Facility: CLINIC | Age: 62
End: 2022-07-27
Attending: EMERGENCY MEDICINE
Payer: COMMERCIAL

## 2022-07-27 VITALS
HEART RATE: 61 BPM | BODY MASS INDEX: 28.26 KG/M2 | RESPIRATION RATE: 16 BRPM | DIASTOLIC BLOOD PRESSURE: 82 MMHG | OXYGEN SATURATION: 99 % | WEIGHT: 202.6 LBS | TEMPERATURE: 97.7 F | SYSTOLIC BLOOD PRESSURE: 151 MMHG

## 2022-07-27 DIAGNOSIS — N20.0 CALCULUS OF KIDNEY: Primary | ICD-10-CM

## 2022-07-27 DIAGNOSIS — N20.1 URETERAL STONE: ICD-10-CM

## 2022-07-27 DIAGNOSIS — N23 RENAL COLIC: ICD-10-CM

## 2022-07-27 PROBLEM — N13.0 ACQUIRED HYDRONEPHROSIS DUE TO OBSTRUCTION OF URETEROPELVIC JUNCTION (UPJ): Status: ACTIVE | Noted: 2022-07-27

## 2022-07-27 LAB
ALBUMIN SERPL BCG-MCNC: 4.2 G/DL (ref 3.5–5.2)
ALBUMIN UR-MCNC: 10 MG/DL
ALP SERPL-CCNC: 62 U/L (ref 35–104)
ALT SERPL W P-5'-P-CCNC: 24 U/L (ref 10–35)
ANION GAP SERPL CALCULATED.3IONS-SCNC: 10 MMOL/L (ref 7–15)
APPEARANCE UR: ABNORMAL
AST SERPL W P-5'-P-CCNC: 29 U/L (ref 10–35)
BASOPHILS # BLD AUTO: 0 10E3/UL (ref 0–0.2)
BASOPHILS NFR BLD AUTO: 1 %
BILIRUB SERPL-MCNC: 0.2 MG/DL
BILIRUB UR QL STRIP: NEGATIVE
BUN SERPL-MCNC: 16.1 MG/DL (ref 8–23)
CALCIUM SERPL-MCNC: 9.1 MG/DL (ref 8.8–10.2)
CHLORIDE SERPL-SCNC: 103 MMOL/L (ref 98–107)
COLOR UR AUTO: YELLOW
CREAT SERPL-MCNC: 1.25 MG/DL (ref 0.51–0.95)
DEPRECATED HCO3 PLAS-SCNC: 24 MMOL/L (ref 22–29)
EOSINOPHIL # BLD AUTO: 0.1 10E3/UL (ref 0–0.7)
EOSINOPHIL NFR BLD AUTO: 2 %
ERYTHROCYTE [DISTWIDTH] IN BLOOD BY AUTOMATED COUNT: 11.9 % (ref 10–15)
GFR SERPL CREATININE-BSD FRML MDRD: 48 ML/MIN/1.73M2
GLUCOSE SERPL-MCNC: 103 MG/DL (ref 70–99)
GLUCOSE UR STRIP-MCNC: NEGATIVE MG/DL
HCT VFR BLD AUTO: 42.4 % (ref 35–47)
HGB BLD-MCNC: 13.7 G/DL (ref 11.7–15.7)
HGB UR QL STRIP: ABNORMAL
IMM GRANULOCYTES # BLD: 0 10E3/UL
IMM GRANULOCYTES NFR BLD: 1 %
KETONES UR STRIP-MCNC: NEGATIVE MG/DL
LEUKOCYTE ESTERASE UR QL STRIP: NEGATIVE
LIPASE SERPL-CCNC: 46 U/L (ref 13–60)
LYMPHOCYTES # BLD AUTO: 2 10E3/UL (ref 0.8–5.3)
LYMPHOCYTES NFR BLD AUTO: 34 %
MCH RBC QN AUTO: 31.1 PG (ref 26.5–33)
MCHC RBC AUTO-ENTMCNC: 32.3 G/DL (ref 31.5–36.5)
MCV RBC AUTO: 96 FL (ref 78–100)
MONOCYTES # BLD AUTO: 0.4 10E3/UL (ref 0–1.3)
MONOCYTES NFR BLD AUTO: 6 %
MUCOUS THREADS #/AREA URNS LPF: PRESENT /LPF
NEUTROPHILS # BLD AUTO: 3.4 10E3/UL (ref 1.6–8.3)
NEUTROPHILS NFR BLD AUTO: 56 %
NITRATE UR QL: NEGATIVE
NRBC # BLD AUTO: 0 10E3/UL
NRBC BLD AUTO-RTO: 0 /100
PH UR STRIP: 6 [PH] (ref 5–7)
PLATELET # BLD AUTO: 191 10E3/UL (ref 150–450)
POTASSIUM SERPL-SCNC: 4 MMOL/L (ref 3.4–5.3)
PROT SERPL-MCNC: 6.6 G/DL (ref 6.4–8.3)
RBC # BLD AUTO: 4.4 10E6/UL (ref 3.8–5.2)
RBC URINE: >182 /HPF
SARS-COV-2 RNA RESP QL NAA+PROBE: NEGATIVE
SODIUM SERPL-SCNC: 137 MMOL/L (ref 136–145)
SP GR UR STRIP: 1.02 (ref 1–1.03)
SQUAMOUS EPITHELIAL: 1 /HPF
UROBILINOGEN UR STRIP-MCNC: NORMAL MG/DL
WBC # BLD AUTO: 5.9 10E3/UL (ref 4–11)
WBC URINE: <1 /HPF

## 2022-07-27 PROCEDURE — 999N000179 XR SURGERY CARM FLUORO LESS THAN 5 MIN W STILLS: Mod: TC

## 2022-07-27 PROCEDURE — 255N000002 HC RX 255 OP 636: Performed by: UROLOGY

## 2022-07-27 PROCEDURE — G0378 HOSPITAL OBSERVATION PER HR: HCPCS

## 2022-07-27 PROCEDURE — 85004 AUTOMATED DIFF WBC COUNT: CPT | Performed by: EMERGENCY MEDICINE

## 2022-07-27 PROCEDURE — 258N000001 HC RX 258: Performed by: UROLOGY

## 2022-07-27 PROCEDURE — 83690 ASSAY OF LIPASE: CPT | Performed by: EMERGENCY MEDICINE

## 2022-07-27 PROCEDURE — 250N000009 HC RX 250: Performed by: NURSE ANESTHETIST, CERTIFIED REGISTERED

## 2022-07-27 PROCEDURE — 96375 TX/PRO/DX INJ NEW DRUG ADDON: CPT

## 2022-07-27 PROCEDURE — 258N000003 HC RX IP 258 OP 636: Performed by: EMERGENCY MEDICINE

## 2022-07-27 PROCEDURE — 999N000141 HC STATISTIC PRE-PROCEDURE NURSING ASSESSMENT: Performed by: UROLOGY

## 2022-07-27 PROCEDURE — 36415 COLL VENOUS BLD VENIPUNCTURE: CPT | Performed by: EMERGENCY MEDICINE

## 2022-07-27 PROCEDURE — 250N000011 HC RX IP 250 OP 636: Performed by: EMERGENCY MEDICINE

## 2022-07-27 PROCEDURE — C2617 STENT, NON-COR, TEM W/O DEL: HCPCS | Performed by: UROLOGY

## 2022-07-27 PROCEDURE — 99285 EMERGENCY DEPT VISIT HI MDM: CPT | Mod: 25

## 2022-07-27 PROCEDURE — C1769 GUIDE WIRE: HCPCS | Performed by: UROLOGY

## 2022-07-27 PROCEDURE — 99234 HOSP IP/OBS SM DT SF/LOW 45: CPT | Performed by: STUDENT IN AN ORGANIZED HEALTH CARE EDUCATION/TRAINING PROGRAM

## 2022-07-27 PROCEDURE — U0005 INFEC AGEN DETEC AMPLI PROBE: HCPCS | Performed by: EMERGENCY MEDICINE

## 2022-07-27 PROCEDURE — 250N000011 HC RX IP 250 OP 636: Performed by: NURSE ANESTHETIST, CERTIFIED REGISTERED

## 2022-07-27 PROCEDURE — 710N000012 HC RECOVERY PHASE 2, PER MINUTE: Performed by: UROLOGY

## 2022-07-27 PROCEDURE — 74420 UROGRAPHY RTRGR +-KUB: CPT | Mod: 26 | Performed by: UROLOGY

## 2022-07-27 PROCEDURE — 82365 CALCULUS SPECTROSCOPY: CPT | Performed by: UROLOGY

## 2022-07-27 PROCEDURE — 272N000001 HC OR GENERAL SUPPLY STERILE: Performed by: UROLOGY

## 2022-07-27 PROCEDURE — 80053 COMPREHEN METABOLIC PANEL: CPT | Performed by: EMERGENCY MEDICINE

## 2022-07-27 PROCEDURE — 74176 CT ABD & PELVIS W/O CONTRAST: CPT

## 2022-07-27 PROCEDURE — 99204 OFFICE O/P NEW MOD 45 MIN: CPT | Mod: 25 | Performed by: UROLOGY

## 2022-07-27 PROCEDURE — 93971 EXTREMITY STUDY: CPT | Mod: LT

## 2022-07-27 PROCEDURE — 250N000011 HC RX IP 250 OP 636: Performed by: UROLOGY

## 2022-07-27 PROCEDURE — 258N000003 HC RX IP 258 OP 636: Performed by: STUDENT IN AN ORGANIZED HEALTH CARE EDUCATION/TRAINING PROGRAM

## 2022-07-27 PROCEDURE — 360N000083 HC SURGERY LEVEL 3 W/ FLUORO, PER MIN: Performed by: UROLOGY

## 2022-07-27 PROCEDURE — 96374 THER/PROPH/DIAG INJ IV PUSH: CPT | Mod: 59

## 2022-07-27 PROCEDURE — 99207 PR NO BILLABLE SERVICE THIS VISIT: CPT | Performed by: INTERNAL MEDICINE

## 2022-07-27 PROCEDURE — 710N000009 HC RECOVERY PHASE 1, LEVEL 1, PER MIN: Performed by: UROLOGY

## 2022-07-27 PROCEDURE — 81001 URINALYSIS AUTO W/SCOPE: CPT | Performed by: EMERGENCY MEDICINE

## 2022-07-27 PROCEDURE — 52356 CYSTO/URETERO W/LITHOTRIPSY: CPT | Mod: LT | Performed by: UROLOGY

## 2022-07-27 PROCEDURE — 250N000009 HC RX 250: Performed by: UROLOGY

## 2022-07-27 PROCEDURE — 96361 HYDRATE IV INFUSION ADD-ON: CPT | Mod: 59

## 2022-07-27 PROCEDURE — 370N000017 HC ANESTHESIA TECHNICAL FEE, PER MIN: Performed by: UROLOGY

## 2022-07-27 DEVICE — STENT URETERAL POLARIS ULTRA 5FRX26CM M0061921230: Type: IMPLANTABLE DEVICE | Site: URETER | Status: FUNCTIONAL

## 2022-07-27 RX ORDER — SULFAMETHOXAZOLE/TRIMETHOPRIM 800-160 MG
1 TABLET ORAL 2 TIMES DAILY
COMMUNITY
End: 2022-09-28

## 2022-07-27 RX ORDER — ONDANSETRON 2 MG/ML
INJECTION INTRAMUSCULAR; INTRAVENOUS PRN
Status: DISCONTINUED | OUTPATIENT
Start: 2022-07-27 | End: 2022-07-27

## 2022-07-27 RX ORDER — EPHEDRINE SULFATE 50 MG/ML
INJECTION, SOLUTION INTRAVENOUS PRN
Status: DISCONTINUED | OUTPATIENT
Start: 2022-07-27 | End: 2022-07-27

## 2022-07-27 RX ORDER — NALOXONE HYDROCHLORIDE 0.4 MG/ML
0.4 INJECTION, SOLUTION INTRAMUSCULAR; INTRAVENOUS; SUBCUTANEOUS
Status: DISCONTINUED | OUTPATIENT
Start: 2022-07-27 | End: 2022-07-27 | Stop reason: HOSPADM

## 2022-07-27 RX ORDER — TAMSULOSIN HYDROCHLORIDE 0.4 MG/1
0.4 CAPSULE ORAL DAILY
Qty: 15 CAPSULE | Refills: 0 | Status: SHIPPED | OUTPATIENT
Start: 2022-07-27 | End: 2022-09-28

## 2022-07-27 RX ORDER — NALOXONE HYDROCHLORIDE 0.4 MG/ML
0.2 INJECTION, SOLUTION INTRAMUSCULAR; INTRAVENOUS; SUBCUTANEOUS
Status: DISCONTINUED | OUTPATIENT
Start: 2022-07-27 | End: 2022-07-27 | Stop reason: HOSPADM

## 2022-07-27 RX ORDER — ATROPA BELLADONNA AND OPIUM 16.2; 3 MG/1; MG/1
SUPPOSITORY RECTAL PRN
Status: DISCONTINUED | OUTPATIENT
Start: 2022-07-27 | End: 2022-07-27 | Stop reason: HOSPADM

## 2022-07-27 RX ORDER — SODIUM CHLORIDE 9 MG/ML
INJECTION, SOLUTION INTRAVENOUS CONTINUOUS
Status: DISCONTINUED | OUTPATIENT
Start: 2022-07-27 | End: 2022-07-27 | Stop reason: HOSPADM

## 2022-07-27 RX ORDER — MEPERIDINE HYDROCHLORIDE 25 MG/ML
12.5 INJECTION INTRAMUSCULAR; INTRAVENOUS; SUBCUTANEOUS
Status: DISCONTINUED | OUTPATIENT
Start: 2022-07-27 | End: 2022-07-27 | Stop reason: HOSPADM

## 2022-07-27 RX ORDER — GLYCOPYRROLATE 0.2 MG/ML
INJECTION, SOLUTION INTRAMUSCULAR; INTRAVENOUS PRN
Status: DISCONTINUED | OUTPATIENT
Start: 2022-07-27 | End: 2022-07-27

## 2022-07-27 RX ORDER — LIDOCAINE HYDROCHLORIDE 10 MG/ML
INJECTION, SOLUTION INFILTRATION; PERINEURAL PRN
Status: DISCONTINUED | OUTPATIENT
Start: 2022-07-27 | End: 2022-07-27

## 2022-07-27 RX ORDER — KETOROLAC TROMETHAMINE 15 MG/ML
10 INJECTION, SOLUTION INTRAMUSCULAR; INTRAVENOUS ONCE
Status: COMPLETED | OUTPATIENT
Start: 2022-07-27 | End: 2022-07-27

## 2022-07-27 RX ORDER — OXYCODONE HYDROCHLORIDE 5 MG/1
5 TABLET ORAL EVERY 4 HOURS PRN
Status: DISCONTINUED | OUTPATIENT
Start: 2022-07-27 | End: 2022-07-27 | Stop reason: HOSPADM

## 2022-07-27 RX ORDER — ONDANSETRON 2 MG/ML
4 INJECTION INTRAMUSCULAR; INTRAVENOUS EVERY 6 HOURS PRN
Status: DISCONTINUED | OUTPATIENT
Start: 2022-07-27 | End: 2022-07-27 | Stop reason: HOSPADM

## 2022-07-27 RX ORDER — SODIUM CHLORIDE, SODIUM LACTATE, POTASSIUM CHLORIDE, CALCIUM CHLORIDE 600; 310; 30; 20 MG/100ML; MG/100ML; MG/100ML; MG/100ML
INJECTION, SOLUTION INTRAVENOUS CONTINUOUS
Status: DISCONTINUED | OUTPATIENT
Start: 2022-07-27 | End: 2022-07-27 | Stop reason: HOSPADM

## 2022-07-27 RX ORDER — PROPOFOL 10 MG/ML
INJECTION, EMULSION INTRAVENOUS PRN
Status: DISCONTINUED | OUTPATIENT
Start: 2022-07-27 | End: 2022-07-27

## 2022-07-27 RX ORDER — CEFAZOLIN SODIUM/WATER 2 G/20 ML
2 SYRINGE (ML) INTRAVENOUS SEE ADMIN INSTRUCTIONS
Status: DISCONTINUED | OUTPATIENT
Start: 2022-07-27 | End: 2022-07-27 | Stop reason: HOSPADM

## 2022-07-27 RX ORDER — ONDANSETRON 4 MG/1
4 TABLET, ORALLY DISINTEGRATING ORAL EVERY 6 HOURS PRN
Status: DISCONTINUED | OUTPATIENT
Start: 2022-07-27 | End: 2022-07-27 | Stop reason: HOSPADM

## 2022-07-27 RX ORDER — TAMSULOSIN HYDROCHLORIDE 0.4 MG/1
0.4 CAPSULE ORAL DAILY
Status: DISCONTINUED | OUTPATIENT
Start: 2022-07-27 | End: 2022-07-27 | Stop reason: HOSPADM

## 2022-07-27 RX ORDER — FENTANYL CITRATE 50 UG/ML
25 INJECTION, SOLUTION INTRAMUSCULAR; INTRAVENOUS
Status: DISCONTINUED | OUTPATIENT
Start: 2022-07-27 | End: 2022-07-27 | Stop reason: HOSPADM

## 2022-07-27 RX ORDER — ONDANSETRON 4 MG/1
4 TABLET, ORALLY DISINTEGRATING ORAL EVERY 30 MIN PRN
Status: DISCONTINUED | OUTPATIENT
Start: 2022-07-27 | End: 2022-07-27 | Stop reason: HOSPADM

## 2022-07-27 RX ORDER — ONDANSETRON 2 MG/ML
4 INJECTION INTRAMUSCULAR; INTRAVENOUS EVERY 30 MIN PRN
Status: DISCONTINUED | OUTPATIENT
Start: 2022-07-27 | End: 2022-07-27 | Stop reason: HOSPADM

## 2022-07-27 RX ORDER — MORPHINE SULFATE 4 MG/ML
4 INJECTION, SOLUTION INTRAMUSCULAR; INTRAVENOUS
Status: DISCONTINUED | OUTPATIENT
Start: 2022-07-27 | End: 2022-07-27

## 2022-07-27 RX ORDER — FENTANYL CITRATE 50 UG/ML
25 INJECTION, SOLUTION INTRAMUSCULAR; INTRAVENOUS EVERY 5 MIN PRN
Status: DISCONTINUED | OUTPATIENT
Start: 2022-07-27 | End: 2022-07-27 | Stop reason: HOSPADM

## 2022-07-27 RX ORDER — LIDOCAINE 40 MG/G
CREAM TOPICAL
Status: DISCONTINUED | OUTPATIENT
Start: 2022-07-27 | End: 2022-07-27 | Stop reason: HOSPADM

## 2022-07-27 RX ORDER — FENTANYL CITRATE 50 UG/ML
INJECTION, SOLUTION INTRAMUSCULAR; INTRAVENOUS PRN
Status: DISCONTINUED | OUTPATIENT
Start: 2022-07-27 | End: 2022-07-27

## 2022-07-27 RX ORDER — DEXAMETHASONE SODIUM PHOSPHATE 4 MG/ML
INJECTION, SOLUTION INTRA-ARTICULAR; INTRALESIONAL; INTRAMUSCULAR; INTRAVENOUS; SOFT TISSUE PRN
Status: DISCONTINUED | OUTPATIENT
Start: 2022-07-27 | End: 2022-07-27

## 2022-07-27 RX ORDER — CEFAZOLIN SODIUM/WATER 2 G/20 ML
2 SYRINGE (ML) INTRAVENOUS
Status: COMPLETED | OUTPATIENT
Start: 2022-07-27 | End: 2022-07-27

## 2022-07-27 RX ADMIN — DEXAMETHASONE SODIUM PHOSPHATE 4 MG: 4 INJECTION, SOLUTION INTRA-ARTICULAR; INTRALESIONAL; INTRAMUSCULAR; INTRAVENOUS; SOFT TISSUE at 14:14

## 2022-07-27 RX ADMIN — KETOROLAC TROMETHAMINE 10 MG: 15 INJECTION, SOLUTION INTRAMUSCULAR; INTRAVENOUS at 05:04

## 2022-07-27 RX ADMIN — PROPOFOL 200 MG: 10 INJECTION, EMULSION INTRAVENOUS at 14:14

## 2022-07-27 RX ADMIN — GLYCOPYRROLATE 0.2 MG: 0.2 INJECTION, SOLUTION INTRAMUSCULAR; INTRAVENOUS at 14:14

## 2022-07-27 RX ADMIN — Medication 2 G: at 14:07

## 2022-07-27 RX ADMIN — MIDAZOLAM 2 MG: 1 INJECTION INTRAMUSCULAR; INTRAVENOUS at 14:07

## 2022-07-27 RX ADMIN — FENTANYL CITRATE 100 MCG: 50 INJECTION, SOLUTION INTRAMUSCULAR; INTRAVENOUS at 14:14

## 2022-07-27 RX ADMIN — ONDANSETRON HYDROCHLORIDE 4 MG: 2 INJECTION, SOLUTION INTRAVENOUS at 14:22

## 2022-07-27 RX ADMIN — EPHEDRINE SULFATE 10 MG: 50 INJECTION, SOLUTION INTRAVENOUS at 14:30

## 2022-07-27 RX ADMIN — SODIUM CHLORIDE, POTASSIUM CHLORIDE, SODIUM LACTATE AND CALCIUM CHLORIDE: 600; 310; 30; 20 INJECTION, SOLUTION INTRAVENOUS at 14:07

## 2022-07-27 RX ADMIN — SODIUM CHLORIDE 1000 ML: 9 INJECTION, SOLUTION INTRAVENOUS at 03:16

## 2022-07-27 RX ADMIN — SODIUM CHLORIDE, POTASSIUM CHLORIDE, SODIUM LACTATE AND CALCIUM CHLORIDE: 600; 310; 30; 20 INJECTION, SOLUTION INTRAVENOUS at 15:00

## 2022-07-27 RX ADMIN — LIDOCAINE HYDROCHLORIDE 30 MG: 10 INJECTION, SOLUTION INFILTRATION; PERINEURAL at 14:14

## 2022-07-27 RX ADMIN — SODIUM CHLORIDE, POTASSIUM CHLORIDE, SODIUM LACTATE AND CALCIUM CHLORIDE: 600; 310; 30; 20 INJECTION, SOLUTION INTRAVENOUS at 07:08

## 2022-07-27 RX ADMIN — MORPHINE SULFATE 4 MG: 4 INJECTION INTRAVENOUS at 03:16

## 2022-07-27 ASSESSMENT — ENCOUNTER SYMPTOMS
BACK PAIN: 1
SHORTNESS OF BREATH: 0
ABDOMINAL PAIN: 1
COUGH: 0
HEMATURIA: 1

## 2022-07-27 NOTE — PHARMACY-ADMISSION MEDICATION HISTORY
Admission medication history interview status for this patient is complete. See [facility] admission navigator for allergy information, prior to admission medications and immunization status.     Medication history interview done, indicate source(s): Patient  Medication history resources (including written lists, pill bottles, clinic record):epic/sure script, paperwork   Pharmacy: [facility]    Changes made to PTA medication list:  Added: septra, sharmila mag  Changed:   Reported as Not Taking:   Removed: epi pen, MOM    Actions taken by pharmacist (provider contacted, etc):sticky note for MD     Additional medication history information:None    Medication reconciliation/reorder completed by provider prior to medication history?  N   (Y/N)         Prior to Admission medications    Medication Sig Last Dose Taking? Auth Provider Long Term End Date   Calcium-Magnesium (SHARMILA/MAG PO) Take 1 teaspoonful by mouth daily 7/26/2022 at Unknown time Yes Unknown, Entered By History     sulfamethoxazole-trimethoprim (BACTRIM DS) 800-160 MG tablet Take 1 tablet by mouth 2 times daily 7/26/2022 at Unknown time Yes Unknown, Entered By History     tamsulosin (FLOMAX) 0.4 MG capsule Take 1 capsule (0.4 mg) by mouth daily 7/25/2022  Charu Dunlap PA-C No

## 2022-07-27 NOTE — ANESTHESIA POSTPROCEDURE EVALUATION
Patient: July Wolf    Procedure: Procedure(s):  Cystoscopy, left ureteroscopy with laser lithotripsy and stone basketing, left ureteral stent placement       Anesthesia Type:  General    Note:     Postop Pain Control: Uneventful            Sign Out: Well controlled pain   PONV: No   Neuro/Psych: Uneventful            Sign Out: Acceptable/Baseline neuro status   Airway/Respiratory: Uneventful            Sign Out: Acceptable/Baseline resp. status   CV/Hemodynamics: Uneventful            Sign Out: Acceptable CV status; No obvious hypovolemia; No obvious fluid overload   Other NRE: NONE   DID A NON-ROUTINE EVENT OCCUR? No           Last vitals:  Vitals Value Taken Time   /79 07/27/22 1530   Temp     Pulse 53 07/27/22 1536   Resp 13 07/27/22 1536   SpO2 100 % 07/27/22 1536   Vitals shown include unvalidated device data.    Electronically Signed By: Joe Chance DO  July 27, 2022  3:37 PM

## 2022-07-27 NOTE — PROGRESS NOTES
Patient seen and examined. H&P reviewed. 61 year old female patient with history of hyperlipidemia, prior kidney stones admitted this morning by Dr. Jackson for hydroureteronephrosis 2/2 obstructive Mid-ureteral Calculus and ANA. Currently on IV fluids and prn pain meds. Pain well controlled. Will continue IV fluids. Urology consulted and planning cystoscopy, left ureteroscopy and laser lithotripsy with basketing and left ureteral stent today.   -Will keep NPO, continue IV fluids and pain meds. Currently denies pain.

## 2022-07-27 NOTE — OP NOTE
Procedure Date: 07/27/2022    SURGEON:  Miguel Harris MD    PREOPERATIVE DIAGNOSIS:  Left ureteral stone.    POSTOPERATIVE DIAGNOSIS:  Left ureteral stone.    PROCEDURE PERFORMED:  Cystoscopy, left retrograde pyelogram, interpretation of fluoroscopic images, left ureteroscopy with thulium laser lithotripsy and stone basketing, placement of 5 x 26 double-J ureteral stent.    ANESTHESIA:  General.    COMPLICATIONS:  None.    INDICATIONS FOR PROCEDURE:  July 59-year-old woman with a large proximal left ureteral stone.  She has a smaller stone in her kidney as well.  She now presents for removal of the stones.    DETAILS OF PROCEDURE:  Risks and benefits of the procedure were explained in detail to the patient, and informed consent obtained.  The patient was brought to the operating room and placed supine on the table where she underwent general endotracheal anesthetic.  She was then moved down to the dorsal lithotomy position.  She was prepped and draped in the standard sterile fashion.    The procedure began by introducing the 22-E[place]ian rigid cystoscope through the urethra into the bladder, performing cystoscopy.  There were no urothelial abnormalities identified.  I cannulated the left orifice with a catheter with ureteral catheter.  I performed retrograde pyelogram.  There was an obvious filling defect in the area of the stone in the proximal left ureter.  I passed a Glidewire into the left kidney and backed off the ureteral catheter along with the scope.  Alongside the Glidewire, I passed the rigid ureteroscope through the urethra and into bladder and up the left ureter.  The stone was seen in the very proximal left ureter.  I used a 365 micron thulium laser fire to break up the stone into multiple fragments.  These fragments were then basketed out and placed in the bladder.  Once all fragments were removed, I performed complete ureteroscopy to make certain all stones had been removed from the left ureter.  I  passed a Glidewire into the left kidney and backed off the rigid scope.  Over this second working Glidewire, I passed the Olympus flexible ureteroscope into the kidney under fluoroscopic guidance.  I performed a complete ureteroscopy.  There were 2 stones in the lower pole of the left kidney.  I basketed out each of these stones individually and placed in the bladder.  I then reinserted the flexible ureteroscope and performed retrograde pyelogram through the scope and performed complete renoscopy to make certain all stones had been removed.  I removed the ureteroscope.  I then loaded a 5 x 26 double-J ureteral stent over the wire.  The wire was pulled back and a good curl was seen in the renal pelvis on fluoroscopy.  I reinserted the cystoscope and good curl was seen in the bladder under direct visualization.  The bladder was drained, scope was removed.  I collected stone fragments and sent them for analysis.  I placed a B and O suppository at the end the case.    The patient tolerated the procedure well without complications.  She went to post-anesthetic care unit in good condition.  She will go home from there.  She will return in 1-2 weeks for stent removal in the office.    Miguel Harris MD        D: 2022   T: 2022   MT: TAVO    Name:     CHELE PRAKASHLionel  MRN:      [id number]        Account:        [id number]   :      1960           Procedure Date: 2022     Document: [id number]

## 2022-07-27 NOTE — ANESTHESIA CARE TRANSFER NOTE
Patient: July Wolf    Procedure: Procedure(s):  Cystoscopy, left ureteroscopy with laser lithotripsy and stone basketing, left ureteral stent placement       Diagnosis: Ureteral stone [N20.1]  Diagnosis Additional Information: No value filed.    Anesthesia Type:   General     Note:    Oropharynx: oropharynx clear of all foreign objects  Level of Consciousness: drowsy  Oxygen Supplementation: face mask  Level of Supplemental Oxygen (L/min / FiO2): 4  Independent Airway: airway patency satisfactory and stable  Dentition: dentition unchanged  Vital Signs Stable: post-procedure vital signs reviewed and stable  Report to RN Given: handoff report given  Patient transferred to: PACU    Handoff Report: Identifed the Patient, Identified the Reponsible Provider, Reviewed the pertinent medical history, Discussed the surgical course, Reviewed Intra-OP anesthesia mangement and issues during anesthesia, Set expectations for post-procedure period and Allowed opportunity for questions and acknowledgement of understanding      Vitals:  Vitals Value Taken Time   BP     Temp     Pulse     Resp     SpO2 100 % 07/27/22 1509   Vitals shown include unvalidated device data.    Electronically Signed By: Dean Dennis Severson, APRN CRNA  July 27, 2022  3:10 PM

## 2022-07-27 NOTE — ED NOTES
[facility]  ED Nurse Handoff Report    July Wolf is a 61 year old female   ED Chief complaint: Abdominal Pain  . ED Diagnosis:   Final diagnoses:   Ureteral stone   Renal colic     Allergies: No Known Allergies    Code Status: Full Code  Activity level - Baseline/Home:  Independent. Activity Level - Current:   Stand by Assist. Lift room needed: No. Bariatric: No   Needed: No   Isolation: No. Infection: Not Applicable.     Vital Signs:   Vitals:    07/27/22 0425 07/27/22 0430 07/27/22 0445 07/27/22 0500   BP:  (!) 154/79  (!) 148/82   Pulse: 62 56  58   Resp:       Temp:       TempSrc:       SpO2: 96% 99% 98% 99%       Cardiac Rhythm:  ,      Pain level:    Patient confused: Yes. Patient Falls Risk: yes only due to pain meds.   Elimination Status: Has voided   Patient Report - Initial Complaint: Pt to ER with c/o left flank pain and LLQ abd pain pt was dx with UTI and placed on antibiotics     Focused Assessment: July Wolf is a 61 year old female with history of hyperlipidemia who presents with persisting LLQ and lower back pain since onset yesterday. The patient reports that 7 days ago she went to Urgent Care for evaluation of hematuria and had a urine culture done which came back negative. She was discharged on Flomax. 4 days ago she went to an ER in [place], [place] where she began to have severe cramping down her left leg, accompanied by a large bruise to her left calf which appeared without injury to the area. She presented to the ED there. They performed a CRP test which came back negative and reportedly was told that was evidence that she likely did not have a PE/DVT. They also did a urinalysis which was felt to be convincing for UTI and the patient was discharged on Septra.  Yesterday the patient began to have myalgias. Before bed she began to experience severe sharp pain to her low back and LLQ. The pain does not shoot down her legs and she reports no recent trauma to her  back. She denies any chest pain, shortness of breath, or cough.      Review of Systems   Respiratory: Negative for cough and shortness of breath.    Cardiovascular: Negative for chest pain.   Gastrointestinal: Positive for abdominal pain.   Genitourinary: Positive for hematuria.   Musculoskeletal: Positive for back pain.    Tests Performed:   CT Abdomen Pelvis w/o Contrast   Final Result   IMPRESSION:    1.  Obstructive 7 x 4 x 11 mm calculi in the left mid ureter with proximal hydroureteronephrosis.   2.  Nonobstructive 4 mm left lower pole collecting system calculi         US Lower Extremity Venous Duplex Left   Final Result   IMPRESSION:   1.  No deep venous thrombosis in the left lower extremity.        . Abnormal Results:   Labs Ordered and Resulted from Time of ED Arrival to Time of ED Departure   ROUTINE UA WITH MICROSCOPIC REFLEX TO CULTURE - Abnormal       Result Value    Color Urine Yellow      Appearance Urine Slightly Cloudy (*)     Glucose Urine Negative      Bilirubin Urine Negative      Ketones Urine Negative      Specific Gravity Urine 1.020      Blood Urine Large (*)     pH Urine 6.0      Protein Albumin Urine 10  (*)     Urobilinogen Urine Normal      Nitrite Urine Negative      Leukocyte Esterase Urine Negative      Mucus Urine Present (*)     RBC Urine >182 (*)     WBC Urine <1      Squamous Epithelials Urine 1     COMPREHENSIVE METABOLIC PANEL - Abnormal    Sodium 137      Potassium 4.0      Creatinine 1.25 (*)     Urea Nitrogen 16.1      Chloride 103      Carbon Dioxide (CO2) 24      Anion Gap 10      Glucose 103 (*)     Calcium 9.1      Protein Total 6.6      Albumin 4.2      Bilirubin Total 0.2      Alkaline Phosphatase 62      AST 29      ALT 24      GFR Estimate 48 (*)    LIPASE - Normal    Lipase 46     CBC WITH PLATELETS AND DIFFERENTIAL    WBC Count 5.9      RBC Count 4.40      Hemoglobin 13.7      Hematocrit 42.4      MCV 96      MCH 31.1      MCHC 32.3      RDW 11.9      Platelet Count  191      % Neutrophils 56      % Lymphocytes 34      % Monocytes 6      % Eosinophils 2      % Basophils 1      % Immature Granulocytes 1      NRBCs per 100 WBC 0      Absolute Neutrophils 3.4      Absolute Lymphocytes 2.0      Absolute Monocytes 0.4      Absolute Eosinophils 0.1      Absolute Basophils 0.0      Absolute Immature Granulocytes 0.0      Absolute NRBCs 0.0     COVID-19 VIRUS (CORONAVIRUS) BY PCR        Treatments provided: see MAR  Family Comments: daughter here  OBS brochure/video discussed/provided to patient:  Yes  ED Medications:   Medications   0.9% sodium chloride BOLUS (0 mLs Intravenous Stopped 7/27/22 0501)     Followed by   sodium chloride 0.9% infusion (has no administration in time range)   morphine (PF) injection 4 mg (4 mg Intravenous Given 7/27/22 0316)   ketorolac (TORADOL) injection 10 mg (10 mg Intravenous Given 7/27/22 0504)     Drips infusing:  No  For the majority of the shift, the patient's behavior Green. Interventions performed were support and reassurance.    Sepsis treatment initiated: No     Patient tested for COVID 19 prior to admission: YES    ED Nurse Name/Phone Number: Maren Macias RN,   5:15 AM    RECEIVING UNIT ED HANDOFF REVIEW    Above ED Nurse Handoff Report was reviewed: Yes  Reviewed by: Ashley Jo RN on July 27, 2022 at 6:06 AM

## 2022-07-27 NOTE — ANESTHESIA PREPROCEDURE EVALUATION
Anesthesia Pre-Procedure Evaluation    Patient: July Wolf   MRN: [id number] : 1960        Procedure : Procedure(s):  Cystoscopy, left ureteroscopy with laser lithotripsy and stone basketing          Past Medical History:   Diagnosis Date     Acquired hydronephrosis due to obstruction of ureteropelvic junction (UPJ) 2022     Hyperlipidemia LDL goal <130     not on meds      Past Surgical History:   Procedure Laterality Date     COLONOSCOPY  3/20/2014    Procedure: COLONOSCOPY;  Colonoscopy;  Surgeon: Satya Augustin MD;  Location: [facility]     COMBINED CYSTOSCOPY, RETROGRADES, URETEROSCOPY, INSERT STENT Left 2016    Procedure: COMBINED CYSTOSCOPY, RETROGRADES, URETEROSCOPY, INSERT STENT;  Surgeon: Eduardo Khalil MD;  Location: [facility]     GENITOURINARY SURGERY       HEAD & NECK SURGERY      tonsillectomy     HYSTERECTOMY, PAP NO LONGER INDICATED      endometriosis, 3 c sections      No Known Allergies   Social History     Tobacco Use     Smoking status: Never Smoker     Smokeless tobacco: Not on file   Substance Use Topics     Alcohol use: Yes     Comment: 3 drinks weekly      Wt Readings from Last 1 Encounters:   22 91.9 kg (202 lb 9.6 oz)        Anesthesia Evaluation   Pt has had prior anesthetic. Type: General.    No history of anesthetic complications       ROS/MED HX  ENT/Pulmonary:  - neg pulmonary ROS     Neurologic:  - neg neurologic ROS     Cardiovascular:     (+) Dyslipidemia -----    METS/Exercise Tolerance:     Hematologic:  - neg hematologic  ROS     Musculoskeletal:  - neg musculoskeletal ROS     GI/Hepatic:  - neg GI/hepatic ROS     Renal/Genitourinary:     (+) renal disease, type: ARF,     Endo:  - neg endo ROS     Psychiatric/Substance Use:  - neg psychiatric ROS     Infectious Disease:  - neg infectious disease ROS     Malignancy:       Other:            Physical Exam    Airway        Mallampati: II   TM distance: > 3 FB   Neck ROM: full   Mouth opening: > 3  cm    Respiratory Devices and Support         Dental           Cardiovascular   cardiovascular exam normal          Pulmonary   pulmonary exam normal                OUTSIDE LABS:  CBC:   Lab Results   Component Value Date    WBC 5.9 07/27/2022    WBC 6.0 07/03/2021    HGB 13.7 07/27/2022    HGB 15.1 07/03/2021    HCT 42.4 07/27/2022    HCT 44.5 07/03/2021     07/27/2022     07/03/2021     BMP:   Lab Results   Component Value Date     07/27/2022     07/03/2021    POTASSIUM 4.0 07/27/2022    POTASSIUM 3.9 07/03/2021    CHLORIDE 103 07/27/2022    CHLORIDE 107 07/03/2021    CO2 24 07/27/2022    CO2 29 07/03/2021    BUN 16.1 07/27/2022    BUN 15 07/03/2021    CR 1.25 (H) 07/27/2022    CR 0.95 07/03/2021     (H) 07/27/2022    GLC 97 07/03/2021     COAGS:   Lab Results   Component Value Date    INR 0.95 09/22/2016     POC: No results found for: BGM, HCG, HCGS  HEPATIC:   Lab Results   Component Value Date    ALBUMIN 4.2 07/27/2022    PROTTOTAL 6.6 07/27/2022    ALT 24 07/27/2022    AST 29 07/27/2022    ALKPHOS 62 07/27/2022    BILITOTAL 0.2 07/27/2022     OTHER:   Lab Results   Component Value Date    SHARMILA 9.1 07/27/2022    LIPASE 46 07/27/2022       Anesthesia Plan    ASA Status:  2      Anesthesia Type: General.     - Airway: LMA   Induction: Intravenous.   Maintenance: Balanced.        Consents    Anesthesia Plan(s) and associated risks, benefits, and realistic alternatives discussed. Questions answered and patient/representative(s) expressed understanding.    - Discussed:     - Discussed with:  Patient      - Extended Intubation/Ventilatory Support Discussed: No.      - Patient is DNR/DNI Status: No    Use of blood products discussed: No .     Postoperative Care    Pain management: IV analgesics, Oral pain medications, Multi-modal analgesia.   PONV prophylaxis: Ondansetron (or other 5HT-3), Dexamethasone or Solumedrol     Comments:                Jair Omer MD

## 2022-07-27 NOTE — PLAN OF CARE
Goal Outcome Evaluation:      Pt A&O x 4. VSS. Up with SBA. NPO except for meds and ice chips. Strain urine. PIV infusing LR at 100 mL/hr. C/o abdominal cramping 4/10.  Cystoscopy go be done today.

## 2022-07-27 NOTE — ED TRIAGE NOTES
Pt to ER with c/o left flank pain and LLQ abd pain pt was dx with UTI and placed on antibiotics

## 2022-07-27 NOTE — H&P
[facility]    History and Physical - Hospitalist Service       Date of Admission:  7/27/2022    Assessment & Plan      July Wolf is a 61 year old female with past medical history significant for hyperlipidemia who presented to [facility] for left lower quadrant pain and was found to have hydronephrosis 2/2 obstructive ureteral calculus.    Hydroureteronephrosis 2/2 Obstructive Mid-ureteral Calculus  Presented with LLQ pain and recent history of hematuria/UTI. Found to have elevated creatinine and obstructing mid-ureteral stone on CT. Emergency department provider discussed with urology who will plan for cystoscopy and possible ureteral stenting tomorrow. Continue IVF and make patient NPO in preparation for procedure.  -Urology consulted, appreciate recommendations  -mIVF @ 100/hr  -NPO    Acute Kidney Injury  Creatinine 1.25. Likely due to obstructive hydronephrosis. Will recheck after urologic intervention.     Hematuria  Large blood and >182 RBC in urine. Suspect due to stone. Will need recheck 1-2 weeks post-discharge to ensure resolution of hematuria.    Hyperlipidemia: Pending med rec.     Diet:   NPO  DVT Prophylaxis: Low Risk/Ambulatory with no VTE prophylaxis indicated  Mitchell Catheter: Not present  Central Lines: None  Cardiac Monitoring: None  Code Status:   Full     The patient's care was discussed with the Bedside Nurse and Patient.    Alessandro Jackson MD, S  Hospitalist Service  [facility]  ______________________________________________________________________    Chief Complaint   LLQ Pain    History is obtained from the patient    History of Present Illness   July Wolf is a 61 year old female with past medical history significant for hyperlipidemia who presented to [facility] on 7/27/2022 with left lower quadrant pain.    Patient stated she began experiencing hematuria last Wednesday, July 20.  She went to urgent care at that  time and a UA was obtained showing large amounts of blood.  She was told that she likely had a kidney stone and was sent home with a strainer for her urine.  She has been straining her urine, but has not noticed any stones.  The blood has decreased in amount since that time, though she did note an increase today.  Since being seen at urgent care the patient went to [place], [place] for vacation.  While there, she noticed that she had a bruise of her left lower extremity with some left leg cramping.  She presented to the emergency room.  A repeat UA was obtained at that time, as well, showing that she likely had a urinary tract infection.  She was started on some Septra.    She presents today, because she noted that she was having some left lower quadrant pain.  She stated it was about 8/10, though she does note that it was not as bad as her prior kidney stone in 2016.  She denies any concurrent fever, chills.  She did have some nausea, but no vomiting.  No diarrhea, constipation.    In the emergency department, she was found to be hemodynamically stable.  Blood pressure is systolic 140-150s.  She was afebrile.  She was satting well on room air.  Laboratory studies were obtained which were largely normal with the exception of creatinine that was 1.25.  Urinalysis showed large blood with RBC greater than 182.  CT abdomen pelvis was obtained showing hydroureteronephrosis due to a 7 x 4 x 11 mm obstructing stone in the mid-left ureter.  She was admitted to observation for further cares.    Review of Systems    A full 10+ point review of systems was performed and found to be negative with the exception of those items noted in the HPI above.    Past Medical History    I have reviewed this patient's medical history and updated it with pertinent information if needed.   Past Medical History:   Diagnosis Date     Hyperlipidemia LDL goal <130     not on meds       Past Surgical History   I have reviewed this patient's  surgical history and updated it with pertinent information if needed.  Past Surgical History:   Procedure Laterality Date     COLONOSCOPY  3/20/2014    Procedure: COLONOSCOPY;  Colonoscopy;  Surgeon: Satya Augustin MD;  Location: [facility]     COMBINED CYSTOSCOPY, RETROGRADES, URETEROSCOPY, INSERT STENT Left 9/22/2016    Procedure: COMBINED CYSTOSCOPY, RETROGRADES, URETEROSCOPY, INSERT STENT;  Surgeon: Eduardo Khalil MD;  Location: [facility]     GENITOURINARY SURGERY       HEAD & NECK SURGERY      tonsillectomy     HYSTERECTOMY, PAP NO LONGER INDICATED  1991    endometriosis, 3 c sections       Social History   I have reviewed this patient's social history and updated it with pertinent information if needed.  Social History     Tobacco Use     Smoking status: Never Smoker   Substance Use Topics     Alcohol use: Yes     Comment: 3 drinks weekly     Drug use: No       Family History   I have reviewed this patient's family history and updated it with pertinent information if needed.  Family History   Problem Relation Age of Onset     Prostate Cancer Father      Lipids Mother      Lipids Brother      Heart Disease Brother      Diabetes Brother      Lipids Other      Lipids Brother      Lipids Brother      Lipids Sister        Prior to Admission Medications   Prior to Admission Medications   Prescriptions Last Dose Informant Patient Reported? Taking?   EPINEPHrine (EPIPEN/ADRENACLICK/OR ANY BX GENERIC EQUIV) 0.3 MG/0.3ML injection 2-pack   No No   Sig: Inject 0.3 mLs (0.3 mg) into the muscle once as needed for anaphylaxis   magnesium hydroxide (MOM) 2400 MG/10ML SUSP   Yes No   tamsulosin (FLOMAX) 0.4 MG capsule   No No   Sig: Take 1 capsule (0.4 mg) by mouth daily      Facility-Administered Medications: None     Allergies   No Known Allergies    Physical Exam   Vital Signs: Temp: 98.9  F (37.2  C) Temp src: Temporal BP: 139/80 Pulse: 62   Resp: 20 SpO2: 96 %      Weight: 0 lbs 0 oz    General: Very pleasant female  resting comfortably in hospital bed.  Awake, alert, interactive.  HEENT: Normocephalic, atraumatic.  PERRL, EOMI.  Conjunctiva clear, sclera anicteric.  Facemask in place.  Cardiac: Regular rate and rhythm without murmur, gallop, rub.  No peripheral edema.  Respiratory: Clear to auscultation bilaterally without wheezes, rales, rhonchi.  Abdominal: Soft, nontender, nondistended.  Normal active bowel sounds.  Musculoskeletal: Moving all extremities appropriately.  Skin: No rashes or abrasions on exposed skin.  Neurologic: Alert and oriented x4.  Cranial nerves II through XII grossly intact.  Psychiatric: Appropriate mood and affect.    Data   Data reviewed today: I reviewed all medications, new labs and imaging results over the last 24 hours.     Recent Labs   Lab 07/27/22  0256   WBC 5.9   HGB 13.7   MCV 96         POTASSIUM 4.0   CHLORIDE 103   CO2 24   BUN 16.1   CR 1.25*   ANIONGAP 10   SHARMILA 9.1   *   ALBUMIN 4.2   PROTTOTAL 6.6   BILITOTAL 0.2   ALKPHOS 62   ALT 24   AST 29   LIPASE 46     Recent Results (from the past 24 hour(s))   US Lower Extremity Venous Duplex Left    Narrative    EXAM: US LOWER EXTREMITY VENOUS DUPLEX LEFT  LOCATION: [facility]  DATE/TIME: 7/27/2022 3:39 AM    INDICATION: Left leg pain and cramping.  COMPARISON: None.  TECHNIQUE: Venous Duplex ultrasound of the left lower extremity with and without compression, augmentation and duplex. Color flow and spectral Doppler with waveform analysis performed.    FINDINGS: Exam includes the common femoral, femoral, popliteal, and contralateral common femoral veins as well as segmentally visualized deep calf veins and greater saphenous vein.     LEFT: No deep vein thrombosis. No superficial thrombophlebitis. No popliteal cyst.      Impression    IMPRESSION:  1.  No deep venous thrombosis in the left lower extremity.   CT Abdomen Pelvis w/o Contrast    Narrative    EXAM: CT ABDOMEN PELVIS W/O  CONTRAST  LOCATION: [facility]  DATE/TIME: 7/27/2022 3:54 AM    INDICATION: hematuria, left flank pain  COMPARISON: Prior study dated 7/3/2021  TECHNIQUE: CT scan of the abdomen and pelvis was performed without IV contrast. Multiplanar reformats were obtained. Dose reduction techniques were used.  CONTRAST: None.    FINDINGS:   LOWER CHEST: Unremarkable    HEPATOBILIARY: No significant mass or bile duct dilatation. No calcified gallstones.     PANCREAS: No significant mass, duct dilatation, or inflammatory change.    SPLEEN: Normal size.    ADRENAL GLANDS: No significant nodules.    KIDNEYS/BLADDER: There is obstructive 7 x 4 x 11 mm calculus seen in the left mid ureter with proximal hydroureteronephrosis. Additionally there is a nonobstructive collecting system calculi seen in the lower pole of the right kidney measuring 4 mm in   maximal diameter. The right kidney and ureter are unremarkable. No bladder calculi are identified.    BOWEL: No obstruction or inflammatory change.    LYMPH NODES: Normal.    VASCULATURE: No abdominal aortic aneurysm.    PELVIC ORGANS: No pelvic masses.    MUSCULOSKELETAL: Unremarkable.      Impression    IMPRESSION:   1.  Obstructive 7 x 4 x 11 mm calculi in the left mid ureter with proximal hydroureteronephrosis.  2.  Nonobstructive 4 mm left lower pole collecting system calculi

## 2022-07-27 NOTE — ED PROVIDER NOTES
History   Chief Complaint:  Abdominal Pain     The history is provided by the patient.      July Wolf is a 61 year old female with history of hyperlipidemia who presents with persisting LLQ and lower back pain since onset yesterday. The patient reports that 7 days ago she went to Urgent Care for evaluation of hematuria and had a urine culture done which came back negative. She was discharged on Flomax. 4 days ago she went to an ER in [place], [place] where she began to have severe cramping down her left leg, accompanied by a large bruise to her left calf which appeared without injury to the area. She presented to the ED there. They performed a CRP test which came back negative and reportedly was told that was evidence that she likely did not have a PE/DVT. They also did a urinalysis which was felt to be convincing for UTI and the patient was discharged on Septra.  Yesterday the patient began to have myalgias. Before bed she began to experience severe sharp pain to her low back and LLQ. The pain does not shoot down her legs and she reports no recent trauma to her back. She denies any chest pain, shortness of breath, or cough.     Review of Systems   Respiratory: Negative for cough and shortness of breath.    Cardiovascular: Negative for chest pain.   Gastrointestinal: Positive for abdominal pain.   Genitourinary: Positive for hematuria.   Musculoskeletal: Positive for back pain.   All other systems reviewed and are negative.    Allergies:  The patient has no known allergies.     Medications:  Epinephrine  MOM  Flomax    Past Medical History:     Hyperlipidemia  Occipital mass  Urolithiasis   Ankylosing spondylitis     Past Surgical History:    Colonoscopy  Cystoscopy and RPG  Genitourinary surgery  Tonsillectomy  Hysterectomy   x3    Family History:    Prostate cancer  Hyperlipidemia x5  Heart disease  Diabetes  Leukemia  SIDS  Ankylosing spondylitis     Social History:  The patient presents to the ED  with her daughter.  The patient arrived to the ED in a private vehicle.  PCP: Isha Demarco NP    Physical Exam     Patient Vitals for the past 24 hrs:   BP Temp Temp src Pulse Resp SpO2   07/27/22 0425 -- -- -- 62 -- 96 %   07/27/22 0420 139/80 -- -- -- -- --   07/27/22 0335 -- -- -- -- -- 97 %   07/27/22 0330 -- -- -- -- -- 94 %   07/27/22 0325 -- -- -- -- -- 98 %   07/27/22 0320 -- -- -- -- -- 100 %   07/27/22 0228 (!) 158/78 98.9  F (37.2  C) Temporal 64 20 99 %     Physical Exam  Constitutional:       General: She is not in acute distress.     Appearance: She is not diaphoretic.   HENT:      Head: Atraumatic.      Mouth/Throat:      Pharynx: No oropharyngeal exudate.   Eyes:      General: No scleral icterus.     Pupils: Pupils are equal, round, and reactive to light.   Cardiovascular:      Rate and Rhythm: Normal rate and regular rhythm.      Heart sounds: Normal heart sounds.   Pulmonary:      Effort: No respiratory distress.      Breath sounds: Normal breath sounds.   Abdominal:      Palpations: Abdomen is soft.      Tenderness: There is abdominal tenderness in the left lower quadrant.   Musculoskeletal:         General: No tenderness.   Skin:     General: Skin is warm.      Capillary Refill: Capillary refill takes less than 2 seconds.      Findings: No rash.   Neurological:      General: No focal deficit present.      Mental Status: She is oriented to person, place, and time.   Psychiatric:         Mood and Affect: Mood normal.         Behavior: Behavior normal.           Emergency Department Course     Imaging:  CT Abdomen Pelvis w/o Contrast   Final Result   IMPRESSION:    1.  Obstructive 7 x 4 x 11 mm calculi in the left mid ureter with proximal hydroureteronephrosis.   2.  Nonobstructive 4 mm left lower pole collecting system calculi         US Lower Extremity Venous Duplex Left   Final Result   IMPRESSION:   1.  No deep venous thrombosis in the left lower extremity.        Report per  radiology    Laboratory:  Labs Ordered and Resulted from Time of ED Arrival to Time of ED Departure   ROUTINE UA WITH MICROSCOPIC REFLEX TO CULTURE - Abnormal       Result Value    Color Urine Yellow      Appearance Urine Slightly Cloudy (*)     Glucose Urine Negative      Bilirubin Urine Negative      Ketones Urine Negative      Specific Gravity Urine 1.020      Blood Urine Large (*)     pH Urine 6.0      Protein Albumin Urine 10  (*)     Urobilinogen Urine Normal      Nitrite Urine Negative      Leukocyte Esterase Urine Negative      Mucus Urine Present (*)     RBC Urine >182 (*)     WBC Urine <1      Squamous Epithelials Urine 1     COMPREHENSIVE METABOLIC PANEL - Abnormal    Sodium 137      Potassium 4.0      Creatinine 1.25 (*)     Urea Nitrogen 16.1      Chloride 103      Carbon Dioxide (CO2) 24      Anion Gap 10      Glucose 103 (*)     Calcium 9.1      Protein Total 6.6      Albumin 4.2      Bilirubin Total 0.2      Alkaline Phosphatase 62      AST 29      ALT 24      GFR Estimate 48 (*)    LIPASE - Normal    Lipase 46     CBC WITH PLATELETS AND DIFFERENTIAL    WBC Count 5.9      RBC Count 4.40      Hemoglobin 13.7      Hematocrit 42.4      MCV 96      MCH 31.1      MCHC 32.3      RDW 11.9      Platelet Count 191      % Neutrophils 56      % Lymphocytes 34      % Monocytes 6      % Eosinophils 2      % Basophils 1      % Immature Granulocytes 1      NRBCs per 100 WBC 0      Absolute Neutrophils 3.4      Absolute Lymphocytes 2.0      Absolute Monocytes 0.4      Absolute Eosinophils 0.1      Absolute Basophils 0.0      Absolute Immature Granulocytes 0.0      Absolute NRBCs 0.0     COVID-19 VIRUS (CORONAVIRUS) BY PCR      Emergency Department Course:     Reviewed:  I reviewed nursing notes, vitals, past medical history and Care Everywhere    Assessments:  0238 I obtained history and examined the patient as noted above.   0400 I rechecked the patient and explained findings.       Interventions:  0316 NS 1 L  IV  0316 Morphine 4 mg IV    Disposition:  The patient was admitted to the hospital under the care of Dr. Jackson.     Impression & Plan     Medical Decision Making:  This patient is a pleasant 61-year-old woman who presents to the emergency department for evaluation of renal colic.  She has been having hematuria over the course of the last week.  She has been on Flomax for that amount of time.  Today she had increased pain and presented to the ED.  CT scan demonstrates an 11 mm stone in the mid ureter.  Given the hydronephrosis with a stone the size in the mid ureter despite using Flomax I discussed case with Dr. Harris who will be taking the patient for cystoscopy and likely stent later today.        Diagnosis:    ICD-10-CM    1. Ureteral stone  N20.1    2. Renal colic  N23        Discharge Medications:  New Prescriptions    No medications on file     Scribe Disclosure:  LISA, Karen Álvarez, am serving as a scribe at 2:31 AM on 7/27/2022 to document services personally performed by Corey Morales MD, based on my observations and the provider's statements to me.        Corey Morales MD  07/27/22 0513

## 2022-07-27 NOTE — PROVIDER NOTIFICATION
Dr Alexander paged to see if patient may discharge from hospitals post operatively.  Patient has discharge orders from Dr. Harris.

## 2022-07-27 NOTE — CONSULTS
[facility] Consultation by [facility]    July Wolf MRN# [id number]   Age: 61 year old YOB: 1960     Date of Admission:  7/27/2022    Reason for consult:  Obstructive hydro       Requesting PA/MD:  Dr. Jackson       Level of consult: Consult, follow and place orders             Impression and Plan:   Impression/Assessment:   July Wolf is a 61 year old female with left mid ureteral stone measuring 1.1 cm.   Recent diagnosis of urinary tract infection and was placed on Septra  ANA      Plan:   -NPO  -plan to OR today for cystoscopy, left ureteroscopy and laser lithotripsy with basketing and left ureteral stent.  Discussed the possibility of being unable to get the stone today needing to return to the OR in several weeks for another procedure.  -IVF fluids  -Flomax 0.4mg QHS - monitor for orthostatic hypotension.   -Strain urine.  -Pain and nausea medication per primary service.  -Possible side effects with an indwelling ureteral stent such as urgency and frequency of urination, dysuria, hematuria, symptoms of urine reflux, and some achiness in the side. Indwelling ureteral stents need to be exchanged every three months or removed by three months.   -Will plan on future metabolic evaluation and follow up.  -Thank you for involving us in the care of this patient.  Possible discharge later today.    Pamella Adler PA-C  [facility]   [phone]               Chief Complaint:   Obstructive hydronephrosis     History is obtained from the patient and EMR.         History of Present Illness:   This patient is a 61 year old female who presented to the emergency department in the overnight hours due to left lower quadrant and back pain that started the day prior.  7 days ago, she went to the ER for hematuria.  Urinalysis was not convincing for infection.  Urine culture had no growth.  It was favored that patient was likely passing a small stone, and she was discharged on Flomax, 0.4 mg.   On Saturday, she went to the ER in [place] due to a large bruise on her leg as well as continued back discomfort.  She was told that she did not have a PE or DVT and urinalysis is concerning for possible UTI.  She was started on Septra.  Urine culture showed >100,000 CFU/mL mixed anthony.  Urine was nitrate and leukocyte esterase negative with pyuria on microscopy.    Patient notes that her pain became very severe yesterday.  She does have a history of nephrolithiasis.  She had a stone on the right in 2016 and underwent ureteroscopy with laser lithotripsy with stent placement with Dr. Khalil.  Stent was removed by Dr. Harris.  Stone composition was calcium oxalate and uric acid.  That was the patient's first stone.  She has not had recurrence until now.  CT imaging was obtained which shows a 1.1 cm stone in the mid left ureter.    Patient currently notes that her pain is under control.  She has some cramping in the abdomen.  She denies any fevers, chills, shortness of breath, vomiting,or chest pain.  She did note some nausea when she was having significant pain.    Is currently NPO.  Urinalysis is not convincing for infection.  Evidence of ANA with creatinine up to 1.25 EGFR 48.  Baseline creatinine 0.90.  She does have a family history of nephrolithiasis in her brothers.  She denies undergoing previous metabolic work-up.      Allergies:  The patient has no known allergies.      Medications:  Current Facility-Administered Medications   Medication     ceFAZolin (ANCEF) intermittent infusion 2 g in 100 mL dextrose PRE-MIX     ceFAZolin (ANCEF) intermittent infusion 2 g in 100 mL dextrose PRE-MIX     lactated ringers infusion     melatonin tablet 1 mg     naloxone (NARCAN) injection 0.2 mg    Or     naloxone (NARCAN) injection 0.4 mg    Or     naloxone (NARCAN) injection 0.2 mg    Or     naloxone (NARCAN) injection 0.4 mg     ondansetron (ZOFRAN ODT) ODT tab 4 mg    Or     ondansetron (ZOFRAN) injection 4 mg     sodium  chloride 0.9% infusion     tamsulosin (FLOMAX) capsule 0.4 mg     Past Medical History:     Hyperlipidemia  Occipital mass  Urolithiasis   Ankylosing spondylitis      Past Surgical History:    Colonoscopy  Cystoscopy and RPG  Genitourinary surgery  Tonsillectomy  Hysterectomy   x3     Family History:    Prostate cancer-father  Hyperlipidemia x5  Heart disease  Diabetes  Leukemia  SIDS  Ankylosing spondylitis   Family history of nephrolithiasis in her brothers.     Social History:  Never smoker.          Review of Systems:   A comprehensive 10-point review of systems was performed and found to be negative except as described in the HPI.     /73   Pulse 54   Temp 98.3  F (36.8  C) (Oral)   Resp 20   Wt 91.9 kg (202 lb 9.6 oz)   SpO2 98%   BMI 28.26 kg/m    PSYCH: NAD  EYES: EOMI  MOUTH: MMM  NECK: Supple, no notable adenopathy  RESP: Unlabored breathing  CARDIAC: No LE edema, regular radial pulse  SKIN: Warm, no rashes  ABD: soft, Nontender  NEURO: AAO x3  URO: Urinating on own.          Data:     Lab Results   Component Value Date    WBC 5.9 2022    HGB 13.7 2022    HCT 42.4 2022    MCV 96 2022     2022     Lab Results   Component Value Date    CR 1.25 (H) 2022    CR 0.95 2021     Recent Labs   Lab 22  0319   COLOR Yellow   APPEARANCE Slightly Cloudy*   URINEGLC Negative   URINEBILI Negative   URINEKETONE Negative   SG 1.020   URINEPH 6.0   PROTEIN 10 *   NITRITE Negative   LEUKEST Negative   RBCU >182*   WBCU <1        IMAGING    US Lower Extremity Venous Duplex Left    Result Date: 2022  EXAM: US LOWER EXTREMITY VENOUS DUPLEX LEFT LOCATION: [facility] DATE/TIME: 2022 3:39 AM INDICATION: Left leg pain and cramping. COMPARISON: None. TECHNIQUE: Venous Duplex ultrasound of the left lower extremity with and without compression, augmentation and duplex. Color flow and spectral Doppler with waveform analysis  performed. FINDINGS: Exam includes the common femoral, femoral, popliteal, and contralateral common femoral veins as well as segmentally visualized deep calf veins and greater saphenous vein. LEFT: No deep vein thrombosis. No superficial thrombophlebitis. No popliteal cyst.     IMPRESSION: 1.  No deep venous thrombosis in the left lower extremity.    CT Abdomen Pelvis w/o Contrast    Result Date: 7/27/2022  EXAM: CT ABDOMEN PELVIS W/O CONTRAST LOCATION: [facility] DATE/TIME: 7/27/2022 3:54 AM INDICATION: hematuria, left flank pain COMPARISON: Prior study dated 7/3/2021 TECHNIQUE: CT scan of the abdomen and pelvis was performed without IV contrast. Multiplanar reformats were obtained. Dose reduction techniques were used. CONTRAST: None. FINDINGS: LOWER CHEST: Unremarkable HEPATOBILIARY: No significant mass or bile duct dilatation. No calcified gallstones. PANCREAS: No significant mass, duct dilatation, or inflammatory change. SPLEEN: Normal size. ADRENAL GLANDS: No significant nodules. KIDNEYS/BLADDER: There is obstructive 7 x 4 x 11 mm calculus seen in the left mid ureter with proximal hydroureteronephrosis. Additionally there is a nonobstructive collecting system calculi seen in the lower pole of the right kidney measuring 4 mm in maximal diameter. The right kidney and ureter are unremarkable. No bladder calculi are identified. BOWEL: No obstruction or inflammatory change. LYMPH NODES: Normal. VASCULATURE: No abdominal aortic aneurysm. PELVIC ORGANS: No pelvic masses. MUSCULOSKELETAL: Unremarkable.     IMPRESSION: 1.  Obstructive 7 x 4 x 11 mm calculi in the left mid ureter with proximal hydroureteronephrosis. 2.  Nonobstructive 4 mm left lower pole collecting system calculi     Discussed with Dr. Steven Adler PA-C   [facility]  [phone]

## 2022-07-28 ENCOUNTER — PATIENT OUTREACH (OUTPATIENT)
Dept: CARE COORDINATION | Facility: CLINIC | Age: 62
End: 2022-07-28

## 2022-07-28 DIAGNOSIS — Z71.89 OTHER SPECIFIED COUNSELING: ICD-10-CM

## 2022-07-28 NOTE — PROGRESS NOTES
Clinic Care Coordination Contact  [facility]/Voicemail       Clinical Data: Care Coordinator Outreach  Outreach attempted x 1.  Left message on patient's voicemail with call back information and requested return call.  Plan:  Care Coordinator will try to reach patient again in 1-2 business days.    Wilfred Rutherford  Community Health Worker  [facility]  Ph:[phone]

## 2022-07-29 NOTE — PROGRESS NOTES
Clinic Care Coordination Contact  [facility]/Voicemail       Clinical Data: Care Coordinator Outreach  Outreach attempted x 2.  Left message on patient's voicemail with call back information and requested return call.  Plan: Care Coordinator will do no further outreaches at this time.        ALEX Fernandes  [phone]  [facility]

## 2022-07-30 LAB
APPEARANCE STONE: NORMAL
COMPN STONE: NORMAL
SPECIMEN WT: 29 MG

## 2022-07-31 NOTE — DISCHARGE SUMMARY
[facility]    Discharge Summary  Hospitalist    Date of Admission:  7/27/2022  Date of Discharge:  7/27/2022  5:24 PM  Discharging Provider: Mook Alexander MD, MD  Date of Service (when I saw the patient): 7/27/2022    Discharge Diagnoses      Hydroureteronephrosis 2/2 Obstructive Mid-ureteral Calculus    Acute Kidney Injury    Hematuria    Hyperlipidemia     History of Present Illness   July Wolf is an 61 year old female who presented with left lower quadrant pain.     Hospital Course   July Wolf is a 61 year old female with past medical history significant for hyperlipidemia who presented to [facility] for left lower quadrant pain and was found to have hydronephrosis 2/2 obstructive ureteral calculus.     Hydroureteronephrosis 2/2 Obstructive Mid-ureteral Calculus  Presented with LLQ pain and recent history of hematuria/UTI. Found to have elevated creatinine and obstructing mid-ureteral stone on CT. Emergency department provider discussed with urology who will plan for cystoscopy and possible ureteral stenting tomorrow. Continue IVF and make patient NPO in preparation for procedure.  -Urology consulted, appreciate recommendations  -mIVF @ 100/hr  -Patient underwent cystoscopy, left retrograde pyelogram, interpretation of fluoroscopic images, left ureteroscopy with thulium laser lithotripsy and stone basketing, placement of 5 x 26 double-J ureteral stent on 7/27/2022. Patient discharged home with a plan to follow up with urology as outpatient.     Acute Kidney Injury  Creatinine 1.25. Likely due to obstructive hydronephrosis. Will recheck after urologic intervention.      Hematuria  Large blood and >182 RBC in urine. Suspect due to stone. Will need recheck 1-2 weeks post-discharge to ensure resolution of hematuria.     Hyperlipidemia: resumed tpa meds      Significant Results and Procedures   Results for orders placed or performed during the hospital encounter of  07/27/22   CT Abdomen Pelvis w/o Contrast    Narrative    EXAM: CT ABDOMEN PELVIS W/O CONTRAST  LOCATION: [facility]  DATE/TIME: 7/27/2022 3:54 AM    INDICATION: hematuria, left flank pain  COMPARISON: Prior study dated 7/3/2021  TECHNIQUE: CT scan of the abdomen and pelvis was performed without IV contrast. Multiplanar reformats were obtained. Dose reduction techniques were used.  CONTRAST: None.    FINDINGS:   LOWER CHEST: Unremarkable    HEPATOBILIARY: No significant mass or bile duct dilatation. No calcified gallstones.     PANCREAS: No significant mass, duct dilatation, or inflammatory change.    SPLEEN: Normal size.    ADRENAL GLANDS: No significant nodules.    KIDNEYS/BLADDER: There is obstructive 7 x 4 x 11 mm calculus seen in the left mid ureter with proximal hydroureteronephrosis. Additionally there is a nonobstructive collecting system calculi seen in the lower pole of the right kidney measuring 4 mm in   maximal diameter. The right kidney and ureter are unremarkable. No bladder calculi are identified.    BOWEL: No obstruction or inflammatory change.    LYMPH NODES: Normal.    VASCULATURE: No abdominal aortic aneurysm.    PELVIC ORGANS: No pelvic masses.    MUSCULOSKELETAL: Unremarkable.      Impression    IMPRESSION:   1.  Obstructive 7 x 4 x 11 mm calculi in the left mid ureter with proximal hydroureteronephrosis.  2.  Nonobstructive 4 mm left lower pole collecting system calculi     US Lower Extremity Venous Duplex Left    Narrative    EXAM: US LOWER EXTREMITY VENOUS DUPLEX LEFT  LOCATION: [facility]  DATE/TIME: 7/27/2022 3:39 AM    INDICATION: Left leg pain and cramping.  COMPARISON: None.  TECHNIQUE: Venous Duplex ultrasound of the left lower extremity with and without compression, augmentation and duplex. Color flow and spectral Doppler with waveform analysis performed.    FINDINGS: Exam includes the common femoral, femoral, popliteal, and contralateral  common femoral veins as well as segmentally visualized deep calf veins and greater saphenous vein.     LEFT: No deep vein thrombosis. No superficial thrombophlebitis. No popliteal cyst.      Impression    IMPRESSION:  1.  No deep venous thrombosis in the left lower extremity.   XR Surgery RODO L/T 5 Min Fluoro w Stills    Narrative    This exam was marked as non-reportable because it will not be read by a   radiologist or a [place] non-radiologist provider.                 Pending Results   None  Code Status   Full Code       Primary Care Physician   Isha Demarco        Discharge Disposition   Discharged to home  Condition at discharge: Stable    Consultations This Hospital Stay   UROLOGY IP CONSULT    Time Spent on this Encounter   I, Mook Alexander MD, MD, personally saw the patient today and spent less than or equal to 30 minutes discharging this patient.    Discharge Orders      Diet Instructions    Resume pre procedure diet     Encourage fluids    Encourage fluids at home to keep urine clear to light pink     Discharge Instructions    Patient to arrange for follow up appointment in 1-2 weeks for stent removal     Discharge Medications   Discharge Medication List as of 7/27/2022  4:37 PM      START taking these medications    Details   !! tamsulosin (FLOMAX) 0.4 MG capsule Take 1 capsule (0.4 mg) by mouth daily, Disp-15 capsule, R-0, E-Prescribe       !! - Potential duplicate medications found. Please discuss with provider.      CONTINUE these medications which have NOT CHANGED    Details   Calcium-Magnesium (SHARMILA/MAG PO) Take 1 teaspoonful by mouth daily, Historical      sulfamethoxazole-trimethoprim (BACTRIM DS) 800-160 MG tablet Take 1 tablet by mouth 2 times daily, Historical      !! tamsulosin (FLOMAX) 0.4 MG capsule Take 1 capsule (0.4 mg) by mouth daily, Disp-12 capsule, R-0, E-Prescribe       !! - Potential duplicate medications found. Please discuss with provider.           Allergies   No Known Allergies  Data   Most Recent 3 CBC's:Recent Labs   Lab Test 07/27/22  0256 07/03/21  1358 06/30/21  1209   WBC 5.9 6.0 4.4   HGB 13.7 15.1 14.3   MCV 96 96 96    210 179      Most Recent 3 BMP's:  Recent Labs   Lab Test 07/27/22  0256 07/03/21  1358 06/30/21  1209    141 138   POTASSIUM 4.0 3.9 4.1   CHLORIDE 103 107 108   CO2 24 29 30   BUN 16.1 15 12   CR 1.25* 0.95 1.00   ANIONGAP 10 5 <1*   SHARMILA 9.1 9.3 9.6   * 97 96     Most Recent 2 LFT's:  Recent Labs   Lab Test 07/27/22  0256 07/03/21  1358   AST 29 16   ALT 24 29   ALKPHOS 62 61   BILITOTAL 0.2 0.9     Most Recent INR's and Anticoagulation Dosing History:  Anticoagulation Dose History     Recent Dosing and Labs Latest Ref Rng & Units 9/22/2016    INR 0.86 - 1.14 0.95        Most Recent 3 Troponin's:No lab results found.  Most Recent Cholesterol Panel:No lab results found.  Most Recent 6 Bacteria Isolates From Any Culture (See EPIC Reports for Culture Details):  Recent Labs   Lab Test 06/30/21  1406 09/29/16  1845   CULT No growth No growth     Most Recent TSH, T4 and A1c Labs:No lab results found.

## 2022-08-05 ENCOUNTER — NURSE TRIAGE (OUTPATIENT)
Dept: CALL CENTER | Age: 62
End: 2022-08-05

## 2022-08-05 ENCOUNTER — OFFICE VISIT (OUTPATIENT)
Dept: UROLOGY | Facility: CLINIC | Age: 62
End: 2022-08-05
Payer: COMMERCIAL

## 2022-08-05 VITALS
SYSTOLIC BLOOD PRESSURE: 136 MMHG | DIASTOLIC BLOOD PRESSURE: 80 MMHG | OXYGEN SATURATION: 98 % | BODY MASS INDEX: 28.28 KG/M2 | HEART RATE: 67 BPM | HEIGHT: 71 IN | WEIGHT: 202 LBS

## 2022-08-05 DIAGNOSIS — Z79.2 PROPHYLACTIC ANTIBIOTIC: ICD-10-CM

## 2022-08-05 DIAGNOSIS — N20.1 URETERAL STONE: Primary | ICD-10-CM

## 2022-08-05 PROCEDURE — 52310 CYSTOSCOPY AND TREATMENT: CPT | Performed by: STUDENT IN AN ORGANIZED HEALTH CARE EDUCATION/TRAINING PROGRAM

## 2022-08-05 PROCEDURE — 99213 OFFICE O/P EST LOW 20 MIN: CPT | Mod: 25 | Performed by: STUDENT IN AN ORGANIZED HEALTH CARE EDUCATION/TRAINING PROGRAM

## 2022-08-05 RX ORDER — SULFAMETHOXAZOLE/TRIMETHOPRIM 800-160 MG
1 TABLET ORAL ONCE
Qty: 1 TABLET | Refills: 0 | Status: SHIPPED | OUTPATIENT
Start: 2022-08-05 | End: 2022-08-05

## 2022-08-05 ASSESSMENT — PAIN SCALES - GENERAL: PAINLEVEL: NO PAIN (0)

## 2022-08-05 NOTE — TELEPHONE ENCOUNTER
2 nd level triage; High priority note onto [facility]    Pt called with increase left kidney, bladder pain. Increase bladder spasms, urgency.  See below note -    Please contact pt for recommendation/plan  Pt # [phone]  Pharmacy: [id number] [address] [address]    Date: 7/27/2022  Cystoscopy, left retrograde pyelogram, interpretation of fluoroscopic images,   left ureteroscopy with thulium laser lithotripsy and stone basketing,   placement of 5 x 26 double-J ureteral stent  Miguel Harris MD         8-5-22 for left kidney stent removeLiu Ibarra MD      Reason for Disposition    SEVERE post-op pain (e.g., excruciating, pain scale 8-10) that is not controlled with pain medications    Additional Information    Negative: Sounds like a life-threatening emergency to the triager    Negative: Chest pain    Negative: Difficulty breathing    Negative: Acting confused (e.g., disoriented, slurred speech) or excessively sleepy    Negative: Surgical incision symptoms and questions    Negative: Pain or burning with passing urine (urination) and male    Negative: Pain or burning with passing urine (urination) and female    Negative: Constipation    Negative: New or worsening leg (calf, thigh) pain    Negative: New or worsening leg swelling    Negative: Dizziness is severe, or persists > 24 hours after surgery    Negative: Symptoms arising from use of a urinary catheter (Mitchell or Coude)    Negative: Cast problems or questions    Negative: Medication question    Negative: Bright red, wide-spread, sunburn-like rash    Negative: Caller has URGENT question and triager unable to answer question    Negative: Fever > 100.4 F (38.0 C)    Negative: SEVERE headache and after spinal (epidural) anesthesia    Negative: Vomiting and persists > 4 hours    Negative: Vomiting and abdomen looks much more swollen than usual    Negative: Drinking very little and dehydration suspected (e.g., no urine  "> 12 hours, very dry mouth, very lightheaded)    Negative: Patient sounds very sick or weak to the triager    Negative: Sounds like a serious complication to the triager    Answer Assessment - Initial Assessment Questions  1. SYMPTOM: \"What's the main symptom you're concerned about?\" (e.g., pain, fever, vomiting)      Stent removed this AM- left  By the time she got to her car, has cramping, urgency to go, left pain kidney area.  2. ONSET: \"When did   start?\"   after stent removed today  3. SURGERY: \"What surgery did you have?\"      Stone removal - see below  4. DATE of SURGERY: \"When was the surgery?\"      Date: 7/27/2022  Cystoscopy, left retrograde pyelogram, interpretation of fluoroscopic images,   left ureteroscopy with thulium laser lithotripsy and stone basketing,   placement of 5 x 26 double-J ureteral stent  Miguel Harris MD         8-5-22 for left kidney stent removeLiu Ibarra MD    5. ANESTHESIA: \" What type of anesthesia did you have?\" (e.g., general, spinal, epidural, local)       6. PAIN: \"Is there any pain?\" If Yes, ask: \"How bad is it?\"  (Scale 1-10; or mild, moderate, severe)     Left back/kidney area, bladder,: constant pressure/cramping/   8,     7. FEVER: \"Do you have a fever?\" If Yes, ask: \"What is your temperature, how was it measured, and when did it start?\"      no  8. VOMITING: \"Is there any vomiting?\" If Yes, ask: \"How many times?\"      No, feels nausea  9. BLEEDING: \"Is there any bleeding?\" If Yes, ask: \"How much?\" and \"Where?\"     Urine - clear, no foul smell  Voiding small amt- hard to tell if empting bladder  10. OTHER SYMPTOMS: \"Do you have any other symptoms?\" (e.g., drainage from wound, painful urination, constipation)        BM today  Does not have any pain meds, does not have any med for bladder spasms  [facility] in [place]  Was prescribed an anti biotic today at clinic visit.    Protocols used: POST-OP SYMPTOMS AND [id number]      "

## 2022-08-05 NOTE — PROGRESS NOTES
"CHIEF COMPLAINT   It was my pleasure to see July Wolf who is a 61 year old female for follow-up of left ureteroscopy.      HPI:  July Wolf is a 61 year old female being seen for stent removal and follow up.  Duration of problem: Few weeks  Previous treatments: Ureteroscopy laser lithotripsy and stent insertion by Dr. Harris on 7/27/2022, previous history of ureteroscopy 6 years ago      Reviewed previous notes from Dr. Harris  Stent has been much more tolerable this time around  Concerned about the composition of the stone and other dietary modifications  Scheduled to see Pamella for follow-up      Exam:  /80   Pulse 67   Ht 1.803 m (5' 11\")   Wt 91.6 kg (202 lb)   SpO2 98%   BMI 28.17 kg/m    General: age-appropriate appearing female in NAD sitting in an exam chair  Resp: no respiratory distress  CV: heart rate regular  Abdomen: Degree of obesity is moderate. Abdomen is soft and nontender. No organomegaly.   : Deferred to cystoscopy  Neuro: grossly non focal. Normal reflexes  Motor: excellent strength throughout    Cystoscopy  Pre-procedure diagnosis: Stent In Situ  Post procedure diagnosis: normal cystoscopy  Procedure performed: cystoscopy and Stent Removal  Surgeon: Liu Ibarra MD  Anesthesia: local    Indications for procedure: Patient is a 61 year old year old female with a history of Ureteroscopy.  Here today for cysto and ureteral stent removal    Description of procedure: After fully informed voluntary consent was obtained patient was brought into the procedure room, identified and placed in a supine position on the cysto table.  The vagina/intraoitus were prepped and draped in a sterile fashion with betadine.  A 15F flexible cystoscope was inserted into the urethra and the bladder and urethra examined in a systematic manner.  There were no tumor stones or diverticula.  Ureteric orifices were normal in position and number and effluxing clear urine and the stent was visualized.   " Distal urethra was normal.  The Stent was removed with a grasping forceps. The patient tolerated the procedure well and there were no complications.      Assessment/Plan: Patient with a history of ureteroscopy for stent removal.  Follow-up as planned  Review of Imaging:  The following imaging exams were independently viewed and interpreted by me and discussed with patient:      Review of Labs:  The following labs were reviewed by me and discussed with the patient:  Stone analysis: Abnormal: 20% calcium oxalate and 80% uric acid stones    Assessment & Plan       Ureteral stone  Stent removal without any issue  Recommended follow-up with Margie to discuss preventive management for stone disease in September which is already scheduled with Pamella Cheng PA-C  Based on the 24-hour urine analysis may need further evaluation for uric acid levels if there is concerns for the same  Discussed about preventative measures specifically related to uric acid and oxalate stones    - CYSTOURETHROSCOPY      Liugenia Ibarra MD  [facility] [place]      ==========================    Additional Billing and Coding Information:  Review of external notes as documented above   Review of the result(s) of each unique test - Stone analysis    Independent interpretation of a test performed by another physician/other qualified health care professional (not separately reported) -       Discussion of management or test interpretation with external physician/other qualified healthcare professional/appropriate source -       Diagnosis or treatment significantly limited by social determinants of health -       8 minutes spent on the date of the encounter doing chart review, review of test results, interpretation of tests, patient visit and documentation apart from the time spent at cystoscopy    ==========================

## 2022-08-05 NOTE — NURSING NOTE
Chief Complaint   Patient presents with     stent removal     Pt here today for stent removal     SIERRA Webster

## 2022-08-05 NOTE — NURSING NOTE
Chief Complaint   Patient presents with     stent removal     Pt here today for stent removal   Prior to the start of the procedure and with procedural staff participation, I verbally confirmed the patient s identity using two indicators, relevant allergies, that the procedure was appropriate and matched the consent or emergent situation, and that the correct equipment/implants were available. Immediately prior to starting the procedure I conducted the Time Out with the procedural staff and re-confirmed the patient s name, procedure, and site/side. I have wiped the patient off with the povidone-Iodine solution, draped them,   and instilled sterile water into the bladder. (The Joint Commission universal protocol was followed.)  Yes    Sedation (Moderate or Deep): None  Bre Souza LPN

## 2022-08-05 NOTE — LETTER
"8/5/2022       RE: July Wolf  [id number]  [place] [facility] [address]     Dear Colleague,    Thank you for referring your patient, July Wolf, to the [facility] [place] at [facility]. Please see a copy of my visit note below.    CHIEF COMPLAINT   It was my pleasure to see July Wolf who is a 61 year old female for follow-up of left ureteroscopy.      HPI:  July Wolf is a 61 year old female being seen for stent removal and follow up.  Duration of problem: Few weeks  Previous treatments: Ureteroscopy laser lithotripsy and stent insertion by Dr. Harris on 7/27/2022, previous history of ureteroscopy 6 years ago      Reviewed previous notes from Dr. Harris  Stent has been much more tolerable this time around  Concerned about the composition of the stone and other dietary modifications  Scheduled to see Pamella for follow-up      Exam:  /80   Pulse 67   Ht 1.803 m (5' 11\")   Wt 91.6 kg (202 lb)   SpO2 98%   BMI 28.17 kg/m    General: age-appropriate appearing female in NAD sitting in an exam chair  Resp: no respiratory distress  CV: heart rate regular  Abdomen: Degree of obesity is moderate. Abdomen is soft and nontender. No organomegaly.   : Deferred to cystoscopy  Neuro: grossly non focal. Normal reflexes  Motor: excellent strength throughout    Cystoscopy  Pre-procedure diagnosis: Stent In Situ  Post procedure diagnosis: normal cystoscopy  Procedure performed: cystoscopy and Stent Removal  Surgeon: Liu Ibarra MD  Anesthesia: local    Indications for procedure: Patient is a 61 year old year old female with a history of Ureteroscopy.  Here today for cysto and ureteral stent removal    Description of procedure: After fully informed voluntary consent was obtained patient was brought into the procedure room, identified and placed in a supine position on the cysto table.  The vagina/intraoitus were prepped and draped in a sterile " fashion with betadine.  A 15F flexible cystoscope was inserted into the urethra and the bladder and urethra examined in a systematic manner.  There were no tumor stones or diverticula.  Ureteric orifices were normal in position and number and effluxing clear urine and the stent was visualized.   Distal urethra was normal.  The Stent was removed with a grasping forceps. The patient tolerated the procedure well and there were no complications.      Assessment/Plan: Patient with a history of ureteroscopy for stent removal.  Follow-up as planned  Review of Imaging:  The following imaging exams were independently viewed and interpreted by me and discussed with patient:      Review of Labs:  The following labs were reviewed by me and discussed with the patient:  Stone analysis: Abnormal: 20% calcium oxalate and 80% uric acid stones    Assessment & Plan       Ureteral stone  Stent removal without any issue  Recommended follow-up with Margie to discuss preventive management for stone disease in September which is already scheduled with Pamella Cheng PA-C  Based on the 24-hour urine analysis may need further evaluation for uric acid levels if there is concerns for the same  Discussed about preventative measures specifically related to uric acid and oxalate stones    - CYSTOURETHROSCOPY      Liunavin Ibarra MD  [facility] [place]      ==========================    Additional Billing and Coding Information:  Review of external notes as documented above   Review of the result(s) of each unique test - Stone analysis    Independent interpretation of a test performed by another physician/other qualified health care professional (not separately reported) -       Discussion of management or test interpretation with external physician/other qualified healthcare professional/appropriate source -       Diagnosis or treatment significantly limited by social determinants of health -       8 minutes spent on the  date of the encounter doing chart review, review of test results, interpretation of tests, patient visit and documentation apart from the time spent at cystoscopy    ==========================

## 2022-08-11 ENCOUNTER — TELEPHONE (OUTPATIENT)
Dept: UROLOGY | Facility: CLINIC | Age: 62
End: 2022-08-11

## 2022-08-11 NOTE — TELEPHONE ENCOUNTER
[facility] Call Center    Phone Message    May a detailed message be left on voicemail: yes     Reason for Call: Patient received her litholink and does not know what she needs to do. Please reach out to patient.    Action Taken: Message routed to:  [facility] Urology    Travel Screening: Not Applicable

## 2022-08-11 NOTE — TELEPHONE ENCOUNTER
Patient given information for Litholink to call and get instructions  For collection.  Felicia Puga LPN

## 2022-08-16 ENCOUNTER — TRANSFERRED RECORDS (OUTPATIENT)
Dept: HEALTH INFORMATION MANAGEMENT | Facility: CLINIC | Age: 62
End: 2022-08-16

## 2022-08-18 ENCOUNTER — TRANSFERRED RECORDS (OUTPATIENT)
Dept: HEALTH INFORMATION MANAGEMENT | Facility: CLINIC | Age: 62
End: 2022-08-18

## 2022-08-18 LAB
ALT SERPL-CCNC: 18 IU/L (ref 0–32)
AST SERPL-CCNC: 14 IU/L (ref 0–40)
CREATININE (EXTERNAL): 1 MG/DL (ref 0.57–1)
GFR ESTIMATED (EXTERNAL): 64 ML/MIN/1.73
GLUCOSE (EXTERNAL): 88 MG/DL (ref 65–99)
POTASSIUM (EXTERNAL): 4.8 MMOL/L (ref 3.5–5.2)

## 2022-09-28 ENCOUNTER — VIRTUAL VISIT (OUTPATIENT)
Dept: UROLOGY | Facility: CLINIC | Age: 62
End: 2022-09-28
Payer: COMMERCIAL

## 2022-09-28 VITALS — WEIGHT: 201 LBS | BODY MASS INDEX: 28.14 KG/M2 | HEIGHT: 71 IN

## 2022-09-28 DIAGNOSIS — R82.993 HIGH URIC ACID IN 24 HOUR URINE SPECIMEN: ICD-10-CM

## 2022-09-28 DIAGNOSIS — R10.32 LLQ ABDOMINAL PAIN: ICD-10-CM

## 2022-09-28 DIAGNOSIS — N20.0 NEPHROLITHIASIS: Primary | ICD-10-CM

## 2022-09-28 PROCEDURE — 99213 OFFICE O/P EST LOW 20 MIN: CPT | Mod: 95 | Performed by: PHYSICIAN ASSISTANT

## 2022-09-28 ASSESSMENT — ENCOUNTER SYMPTOMS
CONSTITUTIONAL NEGATIVE: 1
HEMATURIA: 0
MYALGIAS: 1
FREQUENCY: 0
ABDOMINAL PAIN: 1
DYSURIA: 0

## 2022-09-28 ASSESSMENT — PAIN SCALES - GENERAL: PAINLEVEL: MODERATE PAIN (4)

## 2022-09-28 NOTE — LETTER
9/28/2022       RE: July Wolf  [id number]  [place] [address]     Dear Colleague,    Thank you for referring your patient, July Wolf, to the [facility] [place] at [facility]. Please see a copy of my visit note below.    PT WILL MEET YOU IN [place]    July is a 61 year old who is being evaluated via a billable video visit.      How would you like to obtain your AVS? [facility]  If the video visit is dropped, the invitation should be resent by: Text to cell phone: [phone]  Will anyone else be joining your video visit? No      Video-Visit Details    Video Start Time: 1304    Type of service:  Video Visit    Video End Time:1316    Originating Location (pt. Location): Other Work    Distant Location (provider location):  [facility]OLOG[facility] [place]     Platform used for Video Visit: EZDOCTOR    CHIEF COMPLAINT/REASON FOR VISIT   Follow up on Litholink and kidney stones    HISTORY OF PRESENT ILLNESS   Ms. Wolf is a pleasant 61-year-old female, who presents today for further evaluation recommendations regarding her kidney stones.  She previously had a ureteroscopy with Dr. Khalil in September 2016.  Composition of her stone at that time was 20% calcium oxalate and 80% uric acid.  Patient did have Litholink at that time and cut out nuts and seafood.  She had an episode where she was hospitalized with a large stone in the ureter on July 27, 2022.  Patient underwent cystoscopy with ureteroscopy and laser lithotripsy with stent placement with Dr. Harris.  Her stent was removed by Dr. Ibarra in office on 08/05/2022.    Does note she did not have a tender pain with her stone, but was bothered with the stent.  Removal of the stent she had painful bladder spasms.  She also endorses continued left lower quadrant abdominal discomfort.  At times, she has started to get some severe discomfort in her upper abdomen.  She does have  concern about passing another stone.  Recent CT imaging did show a residual stone on the right.    Patient also notes having nightly muscle spasms that are sometimes very severe.  She has been working on her hydration is trying to drink 120 ounces daily.  She notes that she eats salmon possibly twice a month and has cut down alcohol intake to 2-3 beers per week and an occasional glass of wine.  She is not ingesting nuts or organ meats.    Most recent stone composition was primarily uric acid.    Denies any hematuria or dysuria.    The following portions of the patient's history were reviewed and updated as appropriate: allergies, current medications, past family history, past medical history, past social history, past surgical history, and problem list.     REVIEW OF SYSTEMS   Review of Systems   Constitutional: Negative.    Gastrointestinal: Positive for abdominal pain.   Genitourinary: Positive for pelvic pain and urgency. Negative for dysuria, frequency and hematuria.   Musculoskeletal: Positive for myalgias.      Per HPI.     Patient Active Problem List   Diagnosis     CARDIOVASCULAR SCREENING; LDL GOAL LESS THAN 160     Hyperlipidemia LDL goal <130     Occipital mass     Urolithiasis     Renal colic     Ureteral stone     Acquired hydronephrosis due to obstruction of ureteropelvic junction (UPJ)      Past Medical History:   Diagnosis Date     Acquired hydronephrosis due to obstruction of ureteropelvic junction (UPJ) 07/27/2022     Hyperlipidemia LDL goal <130     not on meds        Objective      PHYSICAL EXAM   GENERAL: Healthy, alert and no distress  EYES: Eyes grossly normal to inspection.  No discharge or erythema, or obvious scleral/conjunctival abnormalities.  HENT: Normal cephalic/atraumatic.  External ears, nose and mouth without ulcers or lesions.  No nasal drainage visible.  NECK: No asymmetry, visible masses or scars  RESP: No audible wheeze, cough, or visible cyanosis.  No visible retractions or  increased work of breathing.    MS: No gross musculoskeletal defects noted.  Normal range of motion.  No visible edema.  SKIN: Visible skin clear. No significant rash, abnormal pigmentation or lesions.  NEURO: Cranial nerves grossly intact.  Mentation and speech appropriate for age.  PSYCH: Mentation appears normal, affect normal/bright, judgement and insight intact, normal speech and appearance well-groomed.     LABORATORY     Recent Labs   Lab Test 07/27/22  0319 07/20/22  1004   COLOR Yellow Yellow   APPEARANCE Slightly Cloudy* Clear   URINEGLC Negative Negative   URINEBILI Negative Negative   URINEKETONE Negative Negative   SG 1.020 1.010   UBLD Large* Large*   URINEPH 6.0 6.0   PROTEIN 10 * 30 *   UROBILINOGEN  --  0.2   NITRITE Negative Negative   LEUKEST Negative Negative   RBCU >182* >100*   WBCU <1 0-5       TESTING    Litholink 08/15/22:    Urine volume 2.37 liters per day; supersaturation calcium oxalate 3.16; urine calcium 191 mg/day; urine oxalate 28 mg/day; urine citrate 925 mg/day; supersaturation calcium phosphate 0.28; 24-hour urine pH 5.536; supersaturation uric acid 1.71; urine uric acid 0.926 g/day.    Creatinine discrepancy.     Litholink 08/16/22:    Urine volume 2.94 liters per day; supersaturation calcium oxalate 2.61; urine calcium 256 mg/day; urine oxalate 25 mg/day; urine citrate 1119 mg/day; supersaturation calcium phosphate 0.84; 24-hour urine pH 6.101; supersaturation uric acid 0.79; urine uric acid 1.380 g/day.    Urine calcium is high and has risen.  Urine sodium is high.  Uric acid excretion has risen and significantly elevated.  Protein intake is not high enough to explain persistent hyperuricosuria.  Consider other causes and usage of allupurinol.    Assessment & Plan    1. Nephrolithiasis    2. High uric acid in 24 hour urine specimen    3. LLQ abdominal pain        I had the pleasure today of meeting with Ms. Wolf to discuss her follow-up for kidney stones as well as her  Litholink x2.  Patient has continued to have some abdominal and flank discomfort after stent removal.  She does have known additional stone within her kidney.  She is worried about passing another stone.  Previous composition is largely uric acid, which typically do not show up on KUB particularly well.    -Would recommend CT of the abdomen pelvis without contrast to look for any ureteral stones.  If negative, would recommend follow-up with primary care provider.  If severe discomfort, would recommend proceeding to the emergency department.    We then looked at her Litholink results which show good urine output, but evidence of elevated urine calcium, fluctuant urine pH, and elevated urine uric acid.  Citrate is very good.  Patient has tried to avoid any foods that are high in purine's or protein.    Would recommend the following:  -Continue with good hydration.  Continue to aim for 2.5 to 3 L of urine output daily.  Your goal of 120 ounces of water daily seems to be reaching that.    -Try to lower sodium intake from the diet.  Aim for less than 3300 mgday.  Ideally a very low sodium diet is less than 2000 mg/day.    We discussed further evaluation of the elevated uric acid in her urine, which has progressed since her last Litholink's.  We discussed possible evaluation with nephrology and further work-up versus trialing allopurinol.  After discussion with patient, she would be more interested in discussing this with nephrology.    -Plan on referral to nephrology to discuss further plan possible further work-up regarding elevated uric acid in the urine.    Contact us in the interim with questions, concerns, or changes in symptomatology.    Signed by:       Pamella Adler PA-C 9/28/2022 12:56 PM

## 2022-09-28 NOTE — PROGRESS NOTES
PT WILL MEET YOU IN [place]    July is a 61 year old who is being evaluated via a billable video visit.      How would you like to obtain your AVS? Who@  If the video visit is dropped, the invitation should be resent by: Text to cell phone: [phone]  Will anyone else be joining your video visit? No      Video-Visit Details    Video Start Time: 1304    Type of service:  Video Visit    Video End Time:1316    Originating Location (pt. Location): Other Work    Distant Location (provider location):  [facility]OLOG[facility] [place]     Platform used for Video Visit: iHandle    CHIEF COMPLAINT/REASON FOR VISIT   Follow up on Litholink and kidney stones    HISTORY OF PRESENT ILLNESS   Ms. Wolf is a pleasant 61-year-old female, who presents today for further evaluation recommendations regarding her kidney stones.  She previously had a ureteroscopy with Dr. Khalil in September 2016.  Composition of her stone at that time was 20% calcium oxalate and 80% uric acid.  Patient did have Litholink at that time and cut out nuts and seafood.  She had an episode where she was hospitalized with a large stone in the ureter on July 27, 2022.  Patient underwent cystoscopy with ureteroscopy and laser lithotripsy with stent placement with Dr. Harris.  Her stent was removed by Dr. Ibarra in office on 08/05/2022.    Does note she did not have a tender pain with her stone, but was bothered with the stent.  Removal of the stent she had painful bladder spasms.  She also endorses continued left lower quadrant abdominal discomfort.  At times, she has started to get some severe discomfort in her upper abdomen.  She does have concern about passing another stone.  Recent CT imaging did show a residual stone on the right.    Patient also notes having nightly muscle spasms that are sometimes very severe.  She has been working on her hydration is trying to drink 120 ounces daily.  She notes that she eats salmon possibly twice a month  and has cut down alcohol intake to 2-3 beers per week and an occasional glass of wine.  She is not ingesting nuts or organ meats.    Most recent stone composition was primarily uric acid.    Denies any hematuria or dysuria.    The following portions of the patient's history were reviewed and updated as appropriate: allergies, current medications, past family history, past medical history, past social history, past surgical history, and problem list.     REVIEW OF SYSTEMS   Review of Systems   Constitutional: Negative.    Gastrointestinal: Positive for abdominal pain.   Genitourinary: Positive for pelvic pain and urgency. Negative for dysuria, frequency and hematuria.   Musculoskeletal: Positive for myalgias.      Per HPI.     Patient Active Problem List   Diagnosis     CARDIOVASCULAR SCREENING; LDL GOAL LESS THAN 160     Hyperlipidemia LDL goal <130     Occipital mass     Urolithiasis     Renal colic     Ureteral stone     Acquired hydronephrosis due to obstruction of ureteropelvic junction (UPJ)      Past Medical History:   Diagnosis Date     Acquired hydronephrosis due to obstruction of ureteropelvic junction (UPJ) 07/27/2022     Hyperlipidemia LDL goal <130     not on meds        Objective      PHYSICAL EXAM   GENERAL: Healthy, alert and no distress  EYES: Eyes grossly normal to inspection.  No discharge or erythema, or obvious scleral/conjunctival abnormalities.  HENT: Normal cephalic/atraumatic.  External ears, nose and mouth without ulcers or lesions.  No nasal drainage visible.  NECK: No asymmetry, visible masses or scars  RESP: No audible wheeze, cough, or visible cyanosis.  No visible retractions or increased work of breathing.    MS: No gross musculoskeletal defects noted.  Normal range of motion.  No visible edema.  SKIN: Visible skin clear. No significant rash, abnormal pigmentation or lesions.  NEURO: Cranial nerves grossly intact.  Mentation and speech appropriate for age.  PSYCH: Mentation appears  normal, affect normal/bright, judgement and insight intact, normal speech and appearance well-groomed.     LABORATORY     Recent Labs   Lab Test 07/27/22  0319 07/20/22  1004   COLOR Yellow Yellow   APPEARANCE Slightly Cloudy* Clear   URINEGLC Negative Negative   URINEBILI Negative Negative   URINEKETONE Negative Negative   SG 1.020 1.010   UBLD Large* Large*   URINEPH 6.0 6.0   PROTEIN 10 * 30 *   UROBILINOGEN  --  0.2   NITRITE Negative Negative   LEUKEST Negative Negative   RBCU >182* >100*   WBCU <1 0-5       TESTING    Litholink 08/15/22:    Urine volume 2.37 liters per day; supersaturation calcium oxalate 3.16; urine calcium 191 mg/day; urine oxalate 28 mg/day; urine citrate 925 mg/day; supersaturation calcium phosphate 0.28; 24-hour urine pH 5.536; supersaturation uric acid 1.71; urine uric acid 0.926 g/day.    Creatinine discrepancy.     Litholink 08/16/22:    Urine volume 2.94 liters per day; supersaturation calcium oxalate 2.61; urine calcium 256 mg/day; urine oxalate 25 mg/day; urine citrate 1119 mg/day; supersaturation calcium phosphate 0.84; 24-hour urine pH 6.101; supersaturation uric acid 0.79; urine uric acid 1.380 g/day.    Urine calcium is high and has risen.  Urine sodium is high.  Uric acid excretion has risen and significantly elevated.  Protein intake is not high enough to explain persistent hyperuricosuria.  Consider other causes and usage of allupurinol.    Assessment & Plan    1. Nephrolithiasis    2. High uric acid in 24 hour urine specimen    3. LLQ abdominal pain        I had the pleasure today of meeting with Ms. Wolf to discuss her follow-up for kidney stones as well as her Litholink x2.  Patient has continued to have some abdominal and flank discomfort after stent removal.  She does have known additional stone within her kidney.  She is worried about passing another stone.  Previous composition is largely uric acid, which typically do not show up on KUB particularly well.    -Would  recommend CT of the abdomen pelvis without contrast to look for any ureteral stones.  If negative, would recommend follow-up with primary care provider.  If severe discomfort, would recommend proceeding to the emergency department.    We then looked at her Litholink results which show good urine output, but evidence of elevated urine calcium, fluctuant urine pH, and elevated urine uric acid.  Citrate is very good.  Patient has tried to avoid any foods that are high in purine's or protein.    Would recommend the following:  -Continue with good hydration.  Continue to aim for 2.5 to 3 L of urine output daily.  Your goal of 120 ounces of water daily seems to be reaching that.    -Try to lower sodium intake from the diet.  Aim for less than 3300 mgday.  Ideally a very low sodium diet is less than 2000 mg/day.    We discussed further evaluation of the elevated uric acid in her urine, which has progressed since her last Litholink's.  We discussed possible evaluation with nephrology and further work-up versus trialing allopurinol.  After discussion with patient, she would be more interested in discussing this with nephrology.    -Plan on referral to nephrology to discuss further plan possible further work-up regarding elevated uric acid in the urine.    Contact us in the interim with questions, concerns, or changes in symptomatology.    Signed by:       Pamella Adler PA-C 9/28/2022 12:56 PM

## 2022-09-28 NOTE — PATIENT INSTRUCTIONS
-Would recommend CT of the abdomen pelvis without contrast to look for any ureteral stones.  If negative, would recommend follow-up with primary care provider.  If severe discomfort, would recommend proceeding to the emergency department.    Would recommend the following:  -Continue with good hydration.  Continue to aim for 2.5 to 3 L of urine output daily.  Your goal of 120 ounces of water daily seems to be reaching that.    -Try to lower sodium intake from the diet.  Aim for less than 3300 mgday.  Ideally a very low sodium diet is less than 2000 mg/day.    -Plan on referral to nephrology to discuss further plan possible further work-up regarding elevated uric acid in the urine.    Contact us in the interim with questions, concerns, or changes in symptomatology.  [phone]

## 2022-09-30 ENCOUNTER — ANCILLARY PROCEDURE (OUTPATIENT)
Dept: CT IMAGING | Facility: CLINIC | Age: 62
End: 2022-09-30
Attending: PHYSICIAN ASSISTANT
Payer: COMMERCIAL

## 2022-09-30 DIAGNOSIS — N20.0 NEPHROLITHIASIS: ICD-10-CM

## 2022-09-30 DIAGNOSIS — R10.32 LLQ ABDOMINAL PAIN: ICD-10-CM

## 2022-09-30 PROCEDURE — 74176 CT ABD & PELVIS W/O CONTRAST: CPT

## 2022-10-09 ENCOUNTER — HEALTH MAINTENANCE LETTER (OUTPATIENT)
Age: 62
End: 2022-10-09

## 2023-02-21 ENCOUNTER — TELEPHONE (OUTPATIENT)
Dept: NEPHROLOGY | Facility: CLINIC | Age: 63
End: 2023-02-21

## 2023-02-21 NOTE — TELEPHONE ENCOUNTER
[facility] Call Center    Phone Message    May a detailed message be left on voicemail: yes     Reason for Call: The patient called stating her appointment was canceled today due to her being in [place]. She says she was not told she had to be in [place] for the appointment. Also, she says Remi says Dr. Watson will fit her into her schedule. She says due to weather, she probably won't get back to [place] until next Wednesday at the earliest. She says she can do 3/2/23 or later. Please review Dr. Watson's schedule and contact patient to reschedule. Thank you.    Action Taken: Message routed to:  [facility] Nephrology    Travel Screening: Not Applicable

## 2023-02-23 NOTE — TELEPHONE ENCOUNTER
Patient called and said she is back in [place]. She was told to call when she got back in [place] and she would be fitted into Dr. Watson's schedule. Please call patient to schedule. Thank you.

## 2023-03-03 ENCOUNTER — VIRTUAL VISIT (OUTPATIENT)
Dept: NEPHROLOGY | Facility: CLINIC | Age: 63
End: 2023-03-03
Payer: COMMERCIAL

## 2023-03-03 VITALS — WEIGHT: 197 LBS | BODY MASS INDEX: 27.48 KG/M2

## 2023-03-03 DIAGNOSIS — R82.993 HYPERURICOSURIA: ICD-10-CM

## 2023-03-03 DIAGNOSIS — N20.0 URIC ACID KIDNEY STONE: Primary | ICD-10-CM

## 2023-03-03 DIAGNOSIS — R25.2 LEG CRAMPS: ICD-10-CM

## 2023-03-03 PROCEDURE — 99204 OFFICE O/P NEW MOD 45 MIN: CPT | Mod: VID | Performed by: INTERNAL MEDICINE

## 2023-03-03 RX ORDER — POTASSIUM CITRATE 10 MEQ/1
10 TABLET, EXTENDED RELEASE ORAL 2 TIMES DAILY WITH MEALS
Qty: 60 TABLET | Refills: 11 | Status: SHIPPED | OUTPATIENT
Start: 2023-03-03

## 2023-03-03 ASSESSMENT — PAIN SCALES - GENERAL: PAINLEVEL: MILD PAIN (2)

## 2023-03-03 NOTE — PROGRESS NOTES
Video-Visit Details    Type of service:  Video Visit    Video Start Time (time video started): 9:00 AM     Video End Time (time video stopped): 9:38 AM     Originating Location (pt. Location): Other work, in [place]    Distant Location (provider location):  Off-site    Mode of Communication:  Video Conference via [facility]  03/03/2023     July Wolf MRN:[id number] YOB: 1960  Primary care provider: Isha Demarco  Requesting physician: Pamella Adler     ASSESSMENT AND RECOMMENDATIONS:   July Wolf is a 62 year old presenting for nephrolithiasis.  # Recurrent kidney stones: stone type: uric acid (7/27/2022 analysis), 20% CaOX and 80% uric acid (9/22/2016)  - no kidney stones noted on 4/25/2013 CT with contrast      # hyperuricosuria - fluctuates on 24 hour collections - note it was 977 mg/day in 2016 with urine creatinine of 2276 mg. August 2022 collection shows levels of 926 mg and 1380 mg with urine creatinine of 1536 and 1701 mg.  Not associated with high protein catabolic rate or significantly high sulfate  reviewed high purine foods (nuts, seeds)   Plant based food groups with moderate and high purine content such as nonsoy and soy legumes, nuts, seeds, brassica vegetables, sea vegetables, spinach, mushrooms - eats some brassica   low fructose- declines sweet stuff, does not add sugar to tea or coffee   low ethanol - 2 per week  Considered uric acid overproduction - given age, I think less likely monogenic disorder of purine overproduction  No evidence of myleoproliferative or lymphoproliferative disorder  Check serum uric acid, was normal at 5 in 2010 so I think genetic hypouricosemia with uric acid kidney stone is less likely.    Start potassium citrate 10 meq bid to ensure consistently alkali urine    Repeat 24 hour chemistries June 2023  Repeat imaging 9/2023  Return in about 5 months (around 8/3/2023).      45 minutes spent on visit, meeting with July Wolf and in chart review and documentation on date of encounter, 23      Annamarie Watson MD  [facility]  Department of Medicine  Division of Renal Disease and Hypertension  [employer]  [employer] Web Console        REASON FOR CONSULT: nephrolithiasis    HISTORY OF PRESENT ILLNESS:  July Wolf is a 62 year old with recurrent kidney stones, primarily uric acid     Had left ureteral stone in , was stone free after procedure in 2016.  CT for flank pain 2021 did not show kidney stones  CT for flank pain and hematuria 2022 showed 7x4x11 mm calculus in left mid ureter with hydronephrosis and additional 4 mm stone in lower pole right kidney      Stone surgical history:   2022 left URS and laser lithotripsy/stone basketing, ureteral stone as well as 2 stones from lower pole of left kidney removed  2016 left URS and stone extractions for left ureteral calculus (no other stones on CT scan done that date and on CT 2016)  Last imagin2022    Stone [place]:   Left: lower pole stones (2022 CT)  Right: stone free    With stent had severe pain, like passing a kidney stone, was difficult to move because of severity of pain. Used oxycodone and things calmed down.  Had COVID 3 weeks ago, now recovering from pink eye. When ill, has worsening discomfort in area of kidney.    Has been taking folic acid since December due to colleague suggesting it may soften the stone. Has extreme muscle cramps in the groin/innter thigh area - can be crippling, happens around 2-3 am and difficult to stand. Gets better with magnesium supplement.    Has significantly high uric acid without evidence that it is coming from meat sources and has decreased alcohol to beer 2-3 per week and occasional wine. No organ meats, no nuts intake. [place] twice a week.  Historically ate a lot of nuts, now has cut them out.  Meat once  "a day at dinner, occasional cut up chicken on salads (about 3 times per week)  Was putting silverio seeds on salad but has decreased that. Quinoa in salads.  Cut back spinach intake.  Does eat brassica (brussel sprouts, cabbage, mustard greens)  Before 1st kidney stone, was doing \"8 weeks to wellness\"    Diet:  1st meal eggs 4 days per week  2nd meal salad  3rd meal turkey burger or hamburger, palm size serving of chicken or pork, rare gallegos  Pasta  oatmeal  Snacks vegetables with humus or blue cheese dip - celery carrots broccoli, bell peppers  Cut back on chips    Fluid intake includes - has always been a good water drinker. Not a lot of coffee, rarely pop, rarely juice, lemonade, tea.    Sodium intake is - cutting down    Calcium intake is - cheese    I reviewed 24 hour urine chemstries:            Family history is positive for kidney stones - 3/7 male siblings, no stones in parents.    REVIEW OF SYSTEMS:  A 10 point review of systems was negative except as noted above.    Problem list  Patient Active Problem List   Diagnosis     CARDIOVASCULAR SCREENING; LDL GOAL LESS THAN 160     Hyperlipidemia LDL goal <130     Occipital mass     Urolithiasis     Renal colic     Ureteral stone     Acquired hydronephrosis due to obstruction of ureteropelvic junction (UPJ)     PSH  Past Surgical History:   Procedure Laterality Date     COLONOSCOPY  3/20/2014    Procedure: COLONOSCOPY;  Colonoscopy;  Surgeon: Satya Augustin MD;  Location:  GI     COMBINED CYSTOSCOPY, RETROGRADES, URETEROSCOPY, INSERT STENT Left 9/22/2016    Procedure: COMBINED CYSTOSCOPY, RETROGRADES, URETEROSCOPY, INSERT STENT;  Surgeon: Eduardo Khalil MD;  Location:  OR     GENITOURINARY SURGERY       HEAD & NECK SURGERY      tonsillectomy     HYSTERECTOMY, PAP NO LONGER INDICATED  1991    endometriosis, 3 c sections     LASER HOLMIUM LITHOTRIPSY URETER(S), INSERT STENT, COMBINED Left 7/27/2022    Procedure: Cystoscopy, left retrograde pyelogram, " interpretation of fluoroscopic images, left ureteroscopy with thulium laser lithotripsy and stone basketing, placement of 5 x 26 double-J ureteral stent;  Surgeon: Miguel Harris MD;  Location: [facility] OR       Social  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, 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 Not Asked   Social History Narrative     Not on file     Social Determinants of Health     Financial Resource Strain: Not on file   Food Insecurity: Not on file   Transportation Needs: Not on file   Physical Activity: Not on file   Stress: Not on file   Social Connections: Not on file   Intimate Partner Violence: Not on file   Housing Stability: Not on file     Family history  + diabetes  + history of muscle pain with statins in siblings  + liver disease in brother       MEDICATIONS:  Current Outpatient Medications   Medication Sig Dispense Refill     Calcium-Magnesium (SHARMILA/MAG PO) Take 1 teaspoonful by mouth daily     folic acid  Pro-biotic  Fish oil  Vitamin D - one pill daily unknown dosage  Zinc  Lysine for cold sores    ALLERGIES:    No Known Allergies      PHYSICAL EXAM:   video visit  GENERAL APPEARANCE: alert and no distress  RESP: normal work of breathing, no conversational dyspnea  NEURO: mentation intact and speech normal    LABS:   I reviewed:  Electrolytes/Renal -   Recent Labs   Lab Test  07/27/22  0256 07/03/21  1358 06/30/21  1209    141 138   POTASSIUM 4.0 3.9 4.1   CHLORIDE 103 107 108   CO2 24 29 30   BUN 16.1 15 12   CR 1.25* 0.95 1.00   * 97 96   SHARMILA 9.1 9.3 9.6       CBC -   Recent Labs   Lab Test 07/27/22  0256 07/03/21  1358 06/30/21  1209   WBC 5.9 6.0 4.4   HGB 13.7 15.1 14.3    210 179       LFTs -   Recent Labs   Lab Test 07/27/22  0256 07/03/21  1358 09/22/16  0249   ALKPHOS 62 61 54   BILITOTAL 0.2 0.9 0.8   ALT 24 29 30   AST 29 16 20   PROTTOTAL 6.6 7.8 7.2   ALBUMIN 4.2 4.1 3.8       Coags -   Recent Labs   Lab Test 09/22/16  0249   INR 0.95       Iron Panel - No lab results found.    Endocrine - No lab results found.    IMAGING:  All imaging studies reviewed by me.     Annamarie Watson MD

## 2023-03-03 NOTE — LETTER
3/3/2023       RE: July Wolf  [address]  [place] [facility] [place] [address]     Dear Colleague,    Thank you for referring your patient, July Wolf, to the [facility] [place] [facility]. Please see a copy of my visit note below.        [facility]  03/03/2023     July Wolf MRN:[id number] YOB: 1960  Primary care provider: Isha Demarco  Requesting physician: Pamella Adler     ASSESSMENT AND RECOMMENDATIONS:   July Wolf is a 62 year old presenting for nephrolithiasis.  # Recurrent kidney stones: stone type: uric acid (7/27/2022 analysis), 20% CaOX and 80% uric acid (9/22/2016)  - no kidney stones noted on 4/25/2013 CT with contrast      # hyperuricosuria - fluctuates on 24 hour collections - note it was 977 mg/day in 2016 with urine creatinine of 2276 mg. August 2022 collection shows levels of 926 mg and 1380 mg with urine creatinine of 1536 and 1701 mg.  Not associated with high protein catabolic rate or significantly high sulfate  reviewed high purine foods (nuts, seeds)   Plant based food groups with moderate and high purine content such as nonsoy and soy legumes, nuts, seeds, brassica vegetables, sea vegetables, spinach, mushrooms - eats some brassica   low fructose- declines sweet stuff, does not add sugar to tea or coffee   low ethanol - 2 per week  Considered uric acid overproduction - given age, I think less likely monogenic disorder of purine overproduction  No evidence of myleoproliferative or lymphoproliferative disorder  Check serum uric acid, was normal at 5 in 2010 so I think genetic hypouricosemia with uric acid kidney stone is less likely.    Start potassium citrate 10 meq bid to ensure consistently alkali urine    Repeat 24 hour chemistries June 2023  Repeat imaging 9/2023  Return in about 5 months (around 8/3/2023).     45 minutes spent on  visit, meeting with July Wolf and in chart review and documentation on date of encounter, 23      Annamarie Watson MD  [facility]  Department of Medicine  Division of Renal Disease and Hypertension  [employer]  ren[employer] Workman Web Console        REASON FOR CONSULT: nephrolithiasis    HISTORY OF PRESENT ILLNESS:  July Wolf is a 62 year old with recurrent kidney stones, primarily uric acid     Had left ureteral stone in , was stone free after procedure in 2016.  CT for flank pain 2021 did not show kidney stones  CT for flank pain and hematuria 2022 showed 7x4x11 mm calculus in left mid ureter with hydronephrosis and additional 4 mm stone in lower pole right kidney      Stone surgical history:   2022 left URS and laser lithotripsy/stone basketing, ureteral stone as well as 2 stones from lower pole of left kidney removed  2016 left URS and stone extractions for left ureteral calculus (no other stones on CT scan done that date and on CT 2016)  Last imagin2022    Stone [place]:   Left: lower pole stones (2022 CT)  Right: stone free    With stent had severe pain, like passing a kidney stone, was difficult to move because of severity of pain. Used oxycodone and things calmed down.  Had COVID 3 weeks ago, now recovering from pink eye. When ill, has worsening discomfort in area of kidney.    Has been taking folic acid since December due to colleague suggesting it may soften the stone. Has extreme muscle cramps in the groin/innter thigh area - can be crippling, happens around 2-3 am and difficult to stand. Gets better with magnesium supplement.    Has significantly high uric acid without evidence that it is coming from meat sources and has decreased alcohol to beer 2-3 per week and occasional wine. No organ meats, no nuts intake. [place] twice a week.  Historically ate a lot of nuts, now has cut them out.  Meat once a day at dinner,  "occasional cut up chicken on salads (about 3 times per week)  Was putting silverio seeds on salad but has decreased that. Quinoa in salads.  Cut back spinach intake.  Does eat brassica (brussel sprouts, cabbage, mustard greens)  Before 1st kidney stone, was doing \"8 weeks to wellness\"    Diet:  1st meal eggs 4 days per week  2nd meal salad  3rd meal turkey burger or hamburger, palm size serving of chicken or pork, rare gallegos  Pasta  oatmeal  Snacks vegetables with humus or blue cheese dip - celery carrots broccoli, bell peppers  Cut back on chips    Fluid intake includes - has always been a good water drinker. Not a lot of coffee, rarely pop, rarely juice, lemonade, tea.    Sodium intake is - cutting down    Calcium intake is - cheese    I reviewed 24 hour urine chemstries:            Family history is positive for kidney stones - 3/7 male siblings, no stones in parents.    REVIEW OF SYSTEMS:  A 10 point review of systems was negative except as noted above.    Problem list  Patient Active Problem List   Diagnosis     CARDIOVASCULAR SCREENING; LDL GOAL LESS THAN 160     Hyperlipidemia LDL goal <130     Occipital mass     Urolithiasis     Renal colic     Ureteral stone     Acquired hydronephrosis due to obstruction of ureteropelvic junction (UPJ)     PSH  Past Surgical History:   Procedure Laterality Date     COLONOSCOPY  3/20/2014    Procedure: COLONOSCOPY;  Colonoscopy;  Surgeon: Satya Augustin MD;  Location:  GI     COMBINED CYSTOSCOPY, RETROGRADES, URETEROSCOPY, INSERT STENT Left 9/22/2016    Procedure: COMBINED CYSTOSCOPY, RETROGRADES, URETEROSCOPY, INSERT STENT;  Surgeon: Eduardo Khalil MD;  Location:  OR     GENITOURINARY SURGERY       HEAD & NECK SURGERY      tonsillectomy     HYSTERECTOMY, PAP NO LONGER INDICATED  1991    endometriosis, 3 c sections     LASER HOLMIUM LITHOTRIPSY URETER(S), INSERT STENT, COMBINED Left 7/27/2022    Procedure: Cystoscopy, left retrograde pyelogram, interpretation of " fluoroscopic images, left ureteroscopy with thulium laser lithotripsy and stone basketing, placement of 5 x 26 double-J ureteral stent;  Surgeon: Miguel Harris MD;  Location: [facility] OR       Social  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, 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 Not Asked   Social History Narrative     Not on file     Social Determinants of Health     Financial Resource Strain: Not on file   Food Insecurity: Not on file   Transportation Needs: Not on file   Physical Activity: Not on file   Stress: Not on file   Social Connections: Not on file   Intimate Partner Violence: Not on file   Housing Stability: Not on file     Family history  + diabetes  + history of muscle pain with statins in siblings  + liver disease in brother       MEDICATIONS:  Current Outpatient Medications   Medication Sig Dispense Refill     Calcium-Magnesium (SHARMILA/MAG PO) Take 1 teaspoonful by mouth daily     folic acid  Pro-biotic  Fish oil  Vitamin D - one pill daily unknown dosage  Zinc  Lysine for cold sores    ALLERGIES:    No Known Allergies      PHYSICAL EXAM:   video visit  GENERAL APPEARANCE: alert and no distress  RESP: normal work of breathing, no conversational dyspnea  NEURO: mentation intact and speech normal    LABS:   I reviewed:  Electrolytes/Renal -   Recent Labs   Lab Test 07/27/22  0256  07/03/21  1358 06/30/21  1209    141 138   POTASSIUM 4.0 3.9 4.1   CHLORIDE 103 107 108   CO2 24 29 30   BUN 16.1 15 12   CR 1.25* 0.95 1.00   * 97 96   SHARMILA 9.1 9.3 9.6       CBC -   Recent Labs   Lab Test 07/27/22  0256 07/03/21  1358 06/30/21  1209   WBC 5.9 6.0 4.4   HGB 13.7 15.1 14.3    210 179       LFTs -   Recent Labs   Lab Test 07/27/22  0256 07/03/21  1358 09/22/16  0249   ALKPHOS 62 61 54   BILITOTAL 0.2 0.9 0.8   ALT 24 29 30   AST 29 16 20   PROTTOTAL 6.6 7.8 7.2   ALBUMIN 4.2 4.1 3.8       Coags -   Recent Labs   Lab Test 09/22/16  0249   INR 0.95       Iron Panel - No lab results found.    Endocrine - No lab results found.    IMAGING:  All imaging studies reviewed by me.     Annamarie Watson MD

## 2023-03-03 NOTE — PROGRESS NOTES
"Video-Visit Details    Type of service:  Video Visit    Video Start Time (time video started): ***    Video End Time (time video stopped): ***    Originating Location (pt. Location): {video visit patient location:[id number]::\"Home\"}    {PROVIDER LOCATION On-site should be selected for visits conducted from your clinic location or adjoining [facility] hospital, academic office, or other nearby [facility] building. Off-site should be selected for all other provider locations, including home:[id number]}    Distant Location (provider location):  {virtual location provider:[id number]}    Mode of Communication:  Video Conference via [employer]      Is the patient currently in the state of [place]? YES    Visit mode:VIDEO    If the visit is dropped, the patient can be reconnected by: VIDEO VISIT: Text to cell phone: [phone]    Will anyone else be joining the visit? NO  {If patient encounters technical issues they should call [phone] :[id number]}    How would you like to obtain your AVS? MyChart    Are changes needed to the allergy or medication list? NO    Reason for visit: Stone Management      "

## 2023-03-06 ENCOUNTER — MEDICAL CORRESPONDENCE (OUTPATIENT)
Dept: HEALTH INFORMATION MANAGEMENT | Facility: CLINIC | Age: 63
End: 2023-03-06
Payer: COMMERCIAL

## 2023-03-25 ENCOUNTER — HEALTH MAINTENANCE LETTER (OUTPATIENT)
Age: 63
End: 2023-03-25

## 2023-07-11 ENCOUNTER — TRANSFERRED RECORDS (OUTPATIENT)
Dept: HEALTH INFORMATION MANAGEMENT | Facility: CLINIC | Age: 63
End: 2023-07-11
Payer: COMMERCIAL

## 2023-07-17 ENCOUNTER — PRE VISIT (OUTPATIENT)
Dept: MULTI SPECIALTY CLINIC | Facility: CLINIC | Age: 63
End: 2023-07-17
Payer: COMMERCIAL

## 2023-07-17 NOTE — TELEPHONE ENCOUNTER
Reason for Visit: Stone Prevention    Diagnosis: recurrent kidney stone    Litholink in: Yes, 7/11/23    Orders/Procedures/Records: 7/25/23 CT Abdomen Pelvis  -BMP not needed    Contact Patient: Yes, left voicemail to July to Call [facility] at [phone] to schedule for labs, as requested by Dr. Watson. Left our phone [phone], if patient has questions    Rooming Requirements: Virtual, make sure Care Everywhere is updated    Lashanda Calderon LPN  07/17/23  9:50 AM

## 2023-07-25 ENCOUNTER — VIRTUAL VISIT (OUTPATIENT)
Dept: NEPHROLOGY | Facility: CLINIC | Age: 63
End: 2023-07-25
Payer: COMMERCIAL

## 2023-07-25 DIAGNOSIS — N20.0 RECURRENT KIDNEY STONES: Primary | ICD-10-CM

## 2023-07-25 DIAGNOSIS — N20.0 URIC ACID KIDNEY STONE: ICD-10-CM

## 2023-07-25 PROCEDURE — 99214 OFFICE O/P EST MOD 30 MIN: CPT | Mod: VID | Performed by: INTERNAL MEDICINE

## 2023-07-25 ASSESSMENT — PAIN SCALES - GENERAL: PAINLEVEL: SEVERE PAIN (6)

## 2023-07-25 NOTE — PATIENT INSTRUCTIONS
Start potassium citrate  24 hour urine litholink Oct 2023  CT scan Nov 2023 (please schedule to complete this before our visit)  Blood test Nov 2023 - please schedule this to complete before our visit    Return in about 4 months (around 11/25/2023).

## 2023-07-25 NOTE — PROGRESS NOTES
Virtual Visit Details    Type of service:  Video Visit   Joined the call at 7/25/2023, 3:42:54 pm.  Left the call at 7/25/2023, 4:09:13 pm.    Originating Location (pt. Location): Home  Distant Location (provider location):  On-site  Platform used for Video Visit: [facility]  07/25/2023     July Wolf MRN:[id number] YOB: 1960  Primary care provider: Isha Demarco  Requesting physician: Pamella Adler     ASSESSMENT AND RECOMMENDATIONS:   July Wolf is a 62 year old presenting for nephrolithiasis.  # Recurrent kidney stones: stone type: uric acid (7/27/2022 analysis), 20% CaOX and 80% uric acid (9/22/2016)  - no kidney stones noted on 4/25/2013 CT with contrast so stones formed between 2647-6935  - 9/2022 CT showed small cluster of stones in left kidney, largest 7 mm  - will arrange CT 11/2023 to follow up on stone burden (needs CT given history of uric acid stones which are radiolucent and not well visualized on other imaging).  - has not yet started potassium citrate, will start now after further discussion  - renal panel with next visit for medication monitoring      # hyperuricosuria - fluctuates on 24 hour collections - note it was 977 mg/day in 2016 with urine creatinine of 2276 mg. August 2022 collection shows levels of 926 mg and 1380 mg with urine creatinine of 1536 and 1701 mg.  Not associated with high protein catabolic rate or significantly high sulfate  reviewed high purine foods (nuts, seeds)   Plant based food groups with moderate and high purine content such as nonsoy and soy legumes, nuts, seeds, brassica vegetables, sea vegetables, spinach, mushrooms - eats some brassica   low fructose- declines sweet stuff, does not add sugar to tea or coffee   low ethanol - 2 per week  Considered uric acid overproduction - given age, I think less likely monogenic disorder of purine overproduction  No evidence of  myleoproliferative or lymphoproliferative disorder  Check serum uric acid, was normal at 5 in  so I think genetic hypouricosemia with uric acid kidney stone is less likely.    35 minutes spent on visit, meeting with July Wolf and in chart review and documentation on date of encounter, 23      Repeat 24 hour chemistries  Oct 2023  Repeat imaging 2023  Return in about 4 months (around 2023).        Annamarie Watson MD  [facility]  Department of Medicine  Division of Renal Disease and Hypertension  [employer]  [email or web address]  Vocera Web Console        REASON FOR VISIT: follow up nephrolithiasis    HISTORY OF PRESENT ILLNESS:  July Wolf is a 62 year old with recurrent kidney stones, primarily uric acid     Had left ureteral stone in , was stone free after procedure in 2016.  CT for flank pain 2021 did not show kidney stones  CT for flank pain and hematuria 2022 showed 7x4x11 mm calculus in left mid ureter with hydronephrosis and additional 4 mm stone in lower pole right kidney      Stone surgical history:   2022 left URS and laser lithotripsy/stone basketing, ureteral stone as well as 2 stones from lower pole of left kidney removed  2016 left URS and stone extractions for left ureteral calculus (no other stones on CT scan done that date and on CT 2016)  Last imagin2022    Stone [place]:   Left: lower pole stones (2022 CT)  Right: stone free    Fluid intake includes - has always been a good water drinker. Not a lot of coffee, rarely pop, rarely juice, lemonade, tea.    Sodium intake is - cutting down    Calcium intake is - cheese    Last visit 3/3/2023 at which time we started potassium citrate 10 meq bid. She opted against starting potassium citrate due to concern for side effects and also her brothers experience of kidney stones self resolving without medications.  Her brother on hospice  in 2023 and has been busy  related to this, had celebration of life last weekend.  Dealing with foot pain, etiology not clear, getting PT, has had imaging, may be related to ankylosing spondylitis. Managing pain with ice, not taking medications.  Getting the fluid in.  Cut back on spinach and lowering salt. Cut back on legumes, cut back on nuts, keeps alcohol down to 2 per week and avoids sugar.  Takes magnesium, Vitamin D, fish oil and zinc.    I reviewed 24 hour urine chemstries:                  Family history is positive for kidney stones - 3/7 male siblings, no stones in parents.    REVIEW OF SYSTEMS:  A 10 point review of systems was negative except as noted above.    Problem list  Patient Active Problem List   Diagnosis     CARDIOVASCULAR SCREENING; LDL GOAL LESS THAN 160     Hyperlipidemia LDL goal <130     Occipital mass     Urolithiasis     Renal colic     Ureteral stone     Acquired hydronephrosis due to obstruction of ureteropelvic junction (UPJ)     PSH  Past Surgical History:   Procedure Laterality Date     COLONOSCOPY  3/20/2014    Procedure: COLONOSCOPY;  Colonoscopy;  Surgeon: Satya Augustin MD;  Location:  GI     COMBINED CYSTOSCOPY, RETROGRADES, URETEROSCOPY, INSERT STENT Left 9/22/2016    Procedure: COMBINED CYSTOSCOPY, RETROGRADES, URETEROSCOPY, INSERT STENT;  Surgeon: Eduardo Khalil MD;  Location:  OR     GENITOURINARY SURGERY       HEAD & NECK SURGERY      tonsillectomy     HYSTERECTOMY, PAP NO LONGER INDICATED  1991    endometriosis, 3 c sections     LASER HOLMIUM LITHOTRIPSY URETER(S), INSERT STENT, COMBINED Left 7/27/2022    Procedure: Cystoscopy, left retrograde pyelogram, interpretation of fluoroscopic images, left ureteroscopy with thulium laser lithotripsy and stone basketing, placement of 5 x 26 double-J ureteral stent;  Surgeon: Miguel Harris MD;  Location:  OR       Social  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, 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  Social  History     Socioeconomic History     Marital status:      Spouse name: Not on file     Number of children: Not on file     Years of education: Not on file     Highest education level: Not on file   Occupational History     Not on file   Tobacco Use     Smoking status: Never     Passive exposure: Never     Smokeless tobacco: Never   Vaping Use     Vaping Use: Never used   Substance and Sexual Activity     Alcohol use: Yes     Comment: 3 drinks weekly     Drug use: No     Sexual activity: Not Currently   Other Topics Concern     Parent/sibling w/ CABG, MI or angioplasty before 65F 55M? Not Asked   Social History Narrative     Not on file     Social Determinants of Health     Financial Resource Strain: Not on file   Food Insecurity: Not on file   Transportation Needs: Not on file   Physical Activity: Not on file   Stress: Not on file   Social Connections: Not on file   Intimate Partner Violence: Not on file   Housing Stability: Not on file     Family history  + diabetes  + history of muscle pain with statins in siblings  + liver disease in brother       MEDICATIONS:  Current Outpatient Medications   Medication Sig Dispense Refill     Calcium-Magnesium (SHARMILA/MAG PO) Take 1 teaspoonful by mouth daily       potassium citrate (UROCIT-K) 10 MEQ (1080 MG) CR tablet Take 1 tablet (10 mEq) by mouth 2 times daily (with meals) 60 tablet 11   folic acid  Pro-biotic  Fish oil  Vitamin D - one pill daily unknown dosage  Zinc  Lysine for cold sores    ALLERGIES:    No Known Allergies      PHYSICAL EXAM:   video visit  GENERAL APPEARANCE: alert and no distress  RESP: normal work of breathing, no conversational dyspnea  NEURO: mentation intact and speech normal    LABS:   I reviewed:  Electrolytes/Renal -   Recent Labs   Lab Test 07/27/22  0256 07/03/21  1358 06/30/21  1209    141 138   POTASSIUM 4.0 3.9 4.1   CHLORIDE 103 107 108   CO2 24 29 30   BUN 16.1 15 12   CR 1.25* 0.95 1.00   * 97 96   SHARMILA 9.1 9.3 9.6        CBC -   Recent Labs   Lab Test 07/27/22  0256 07/03/21  1358 06/30/21  1209   WBC 5.9 6.0 4.4   HGB 13.7 15.1 14.3    210 179       LFTs -   Recent Labs   Lab Test 07/27/22  0256 07/03/21  1358 09/22/16  0249   ALKPHOS 62 61 54   BILITOTAL 0.2 0.9 0.8   ALT 24 29 30   AST 29 16 20   PROTTOTAL 6.6 7.8 7.2   ALBUMIN 4.2 4.1 3.8       Coags -   Recent Labs   Lab Test 09/22/16  0249   INR 0.95       Iron Panel - No lab results found.    Endocrine - No lab results found.    IMAGING:  All imaging studies reviewed by me.     Annamarie Watson MD

## 2023-07-25 NOTE — LETTER
7/25/2023       RE: July Wolf  [address]  [place] [address]     Dear Colleague,    Thank you for referring your patient, July Wolf, to the [facility] [place] [facility]. Please see a copy of my visit note below.    [facility]  07/25/2023     July Wolf MRN:[id number] YOB: 1960  Primary care provider: Isha Demarco  Requesting physician: Pamella Adler     ASSESSMENT AND RECOMMENDATIONS:   July Wolf is a 62 year old presenting for nephrolithiasis.  # Recurrent kidney stones: stone type: uric acid (7/27/2022 analysis), 20% CaOX and 80% uric acid (9/22/2016)  - no kidney stones noted on 4/25/2013 CT with contrast so stones formed between 1537-5542  - 9/2022 CT showed small cluster of stones in left kidney, largest 7 mm  - will arrange CT 11/2023 to follow up on stone burden (needs CT given history of uric acid stones which are radiolucent and not well visualized on other imaging).  - has not yet started potassium citrate, will start now after further discussion  - renal panel with next visit for medication monitoring      # hyperuricosuria - fluctuates on 24 hour collections - note it was 977 mg/day in 2016 with urine creatinine of 2276 mg. August 2022 collection shows levels of 926 mg and 1380 mg with urine creatinine of 1536 and 1701 mg.  Not associated with high protein catabolic rate or significantly high sulfate  reviewed high purine foods (nuts, seeds)   Plant based food groups with moderate and high purine content such as nonsoy and soy legumes, nuts, seeds, brassica vegetables, sea vegetables, spinach, mushrooms - eats some brassica   low fructose- declines sweet stuff, does not add sugar to tea or coffee   low ethanol - 2 per week  Considered uric acid overproduction - given age, I think less likely monogenic disorder of purine  overproduction  No evidence of myleoproliferative or lymphoproliferative disorder  Check serum uric acid, was normal at 5 in  so I think genetic hypouricosemia with uric acid kidney stone is less likely.    35 minutes spent on visit, meeting with July Wolf and in chart review and documentation on date of encounter, 23      Repeat 24 hour chemistries  Oct 2023  Repeat imaging 2023  Return in about 4 months (around 2023).        Annamarie Watson MD  [facility]  Department of Medicine  Division of Renal Disease and Hypertension  [employer]  [email or web address]  Vocera Web Console        REASON FOR VISIT: follow up nephrolithiasis    HISTORY OF PRESENT ILLNESS:  July Wlof is a 62 year old with recurrent kidney stones, primarily uric acid     Had left ureteral stone in , was stone free after procedure in 2016.  CT for flank pain 2021 did not show kidney stones  CT for flank pain and hematuria 2022 showed 7x4x11 mm calculus in left mid ureter with hydronephrosis and additional 4 mm stone in lower pole right kidney      Stone surgical history:   2022 left URS and laser lithotripsy/stone basketing, ureteral stone as well as 2 stones from lower pole of left kidney removed  2016 left URS and stone extractions for left ureteral calculus (no other stones on CT scan done that date and on CT 2016)  Last imagin2022    Stone [place]:   Left: lower pole stones (2022 CT)  Right: stone free    Fluid intake includes - has always been a good water drinker. Not a lot of coffee, rarely pop, rarely juice, lemonade, tea.    Sodium intake is - cutting down    Calcium intake is - cheese    Last visit 3/3/2023 at which time we started potassium citrate 10 meq bid. She opted against starting potassium citrate due to concern for side effects and also her brothers experience of kidney stones self resolving without medications.  Her brother on hospice  in  April 2023 and has been busy related to this, had celebration of life last weekend.  Dealing with foot pain, etiology not clear, getting PT, has had imaging, may be related to ankylosing spondylitis. Managing pain with ice, not taking medications.  Getting the fluid in.  Cut back on spinach and lowering salt. Cut back on legumes, cut back on nuts, keeps alcohol down to 2 per week and avoids sugar.  Takes magnesium, Vitamin D, fish oil and zinc.    I reviewed 24 hour urine chemstries:                  Family history is positive for kidney stones - 3/7 male siblings, no stones in parents.    REVIEW OF SYSTEMS:  A 10 point review of systems was negative except as noted above.    Problem list  Patient Active Problem List   Diagnosis    CARDIOVASCULAR SCREENING; LDL GOAL LESS THAN 160    Hyperlipidemia LDL goal <130    Occipital mass    Urolithiasis    Renal colic    Ureteral stone    Acquired hydronephrosis due to obstruction of ureteropelvic junction (UPJ)     PSH  Past Surgical History:   Procedure Laterality Date    COLONOSCOPY  3/20/2014    Procedure: COLONOSCOPY;  Colonoscopy;  Surgeon: Satya Augustin MD;  Location:  GI    COMBINED CYSTOSCOPY, RETROGRADES, URETEROSCOPY, INSERT STENT Left 9/22/2016    Procedure: COMBINED CYSTOSCOPY, RETROGRADES, URETEROSCOPY, INSERT STENT;  Surgeon: Eduardo Khalil MD;  Location:  OR    GENITOURINARY SURGERY      HEAD & NECK SURGERY      tonsillectomy    HYSTERECTOMY, PAP NO LONGER INDICATED  1991    endometriosis, 3 c sections    LASER HOLMIUM LITHOTRIPSY URETER(S), INSERT STENT, COMBINED Left 7/27/2022    Procedure: Cystoscopy, left retrograde pyelogram, interpretation of fluoroscopic images, left ureteroscopy with thulium laser lithotripsy and stone basketing, placement of 5 x 26 double-J ureteral stent;  Surgeon: Miguel Harris MD;  Location:  OR       Social  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, 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 Not Asked   Social History Narrative    Not on file     Social Determinants of Health     Financial Resource Strain: Not on file   Food Insecurity: Not on file   Transportation Needs: Not on file   Physical Activity: Not on file   Stress: Not on file   Social Connections: Not on file   Intimate Partner Violence: Not on file   Housing Stability: Not on file     Family history  + diabetes  + history of muscle pain with statins in siblings  + liver disease in brother       MEDICATIONS:  Current Outpatient Medications   Medication Sig Dispense Refill    Calcium-Magnesium (SHARMILA/MAG PO) Take 1 teaspoonful by mouth daily      potassium citrate (UROCIT-K) 10 MEQ (1080 MG) CR tablet Take 1 tablet (10 mEq) by mouth 2 times daily (with meals) 60 tablet 11   folic acid  Pro-biotic  Fish oil  Vitamin D - one pill daily unknown dosage  Zinc  Lysine for cold sores    ALLERGIES:    No Known Allergies      PHYSICAL EXAM:   video visit  GENERAL APPEARANCE: alert and no distress  RESP: normal work of breathing, no conversational dyspnea  NEURO: mentation intact and speech normal    LABS:   I reviewed:  Electrolytes/Renal -   Recent Labs   Lab Test 07/27/22  0256 07/03/21  1358 06/30/21  1209    141 138   POTASSIUM 4.0 3.9 4.1   CHLORIDE 103 107 108   CO2 24 29 30   BUN 16.1 15 12   CR 1.25* 0.95 1.00   * 97 96   SHARMILA 9.1 9.3  9.6       CBC -   Recent Labs   Lab Test 07/27/22  0256 07/03/21  1358 06/30/21  1209   WBC 5.9 6.0 4.4   HGB 13.7 15.1 14.3    210 179       LFTs -   Recent Labs   Lab Test 07/27/22  0256 07/03/21  1358 09/22/16  0249   ALKPHOS 62 61 54   BILITOTAL 0.2 0.9 0.8   ALT 24 29 30   AST 29 16 20   PROTTOTAL 6.6 7.8 7.2   ALBUMIN 4.2 4.1 3.8       Coags -   Recent Labs   Lab Test 09/22/16  0249   INR 0.95       Iron Panel - No lab results found.    Endocrine - No lab results found.    IMAGING:  All imaging studies reviewed by me.     Annamarie Watson MD

## 2023-07-25 NOTE — NURSING NOTE
Is the patient currently in the state of [place]? YES    Visit mode:VIDEO    If the visit is dropped, the patient can be reconnected by: VIDEO VISIT: Text to cell phone: [phone]    Will anyone else be joining the visit? NO      How would you like to obtain your AVS? MyChart    Are changes needed to the allergy or medication list? NO    Reason for visit: RECHECK

## 2023-07-27 ENCOUNTER — MEDICAL CORRESPONDENCE (OUTPATIENT)
Dept: HEALTH INFORMATION MANAGEMENT | Facility: CLINIC | Age: 63
End: 2023-07-27
Payer: COMMERCIAL

## 2023-07-27 NOTE — NURSING NOTE
Chief Complaint   Patient presents with    RECHECK     [employer] order form filled out and faxed to [employer] at [phone].    Lashanda Calderon LPN  07/27/23  11:24 AM

## 2023-10-17 NOTE — PATIENT INSTRUCTIONS
"Follow-up with Pamella as planned   Lithgumaro X 2  AFTER YOUR CYSTOSCOPY        You have just completed a cystoscopy, or \"cysto\", which allowed your physician to learn more about your bladder (or to remove a stent placed after surgery). We suggest that you continue to avoid caffeine, fruit juice, and alcohol for the next 24 hours, however, you are encouraged to return to your normal activities.         A few things that are considered normal after your cystoscopy:     * Small amount of bleeding (or spotting) that clears within the next 24 hours     * Slight burning sensation with urination     * Sensation to of needing to avoid more frequently     * The feeling of \"air\" in your urine     * Mild discomfort that is relieved with Tylenol        Please contact our office promptly if you:     * Develop a fever above 101 degrees     * Are unable to urinate     * Develop bright red blood that does not stop     * Severe pain or swelling         Please contact our office with any concerns or questions @[email or web address].  "
Never

## 2023-10-28 ENCOUNTER — HEALTH MAINTENANCE LETTER (OUTPATIENT)
Age: 63
End: 2023-10-28

## 2023-12-12 ENCOUNTER — OFFICE VISIT (OUTPATIENT)
Dept: URGENT CARE | Facility: URGENT CARE | Age: 63
End: 2023-12-12
Payer: COMMERCIAL

## 2023-12-12 VITALS
DIASTOLIC BLOOD PRESSURE: 78 MMHG | RESPIRATION RATE: 14 BRPM | TEMPERATURE: 97.4 F | SYSTOLIC BLOOD PRESSURE: 120 MMHG | OXYGEN SATURATION: 96 % | HEART RATE: 77 BPM

## 2023-12-12 DIAGNOSIS — R07.0 THROAT PAIN: Primary | ICD-10-CM

## 2023-12-12 DIAGNOSIS — R05.1 ACUTE COUGH: ICD-10-CM

## 2023-12-12 LAB — DEPRECATED S PYO AG THROAT QL EIA: NEGATIVE

## 2023-12-12 PROCEDURE — 99213 OFFICE O/P EST LOW 20 MIN: CPT | Performed by: PHYSICIAN ASSISTANT

## 2023-12-12 PROCEDURE — 87651 STREP A DNA AMP PROBE: CPT | Performed by: PHYSICIAN ASSISTANT

## 2023-12-12 NOTE — PROGRESS NOTES
Assessment & Plan     Throat pain  Rapid strep is negative today.  No evidence of posterior pharynx abscess.  Throat culture is pending.  Supportive care measures advised: tylenol, motrin, throat lozenges.  We will communicate any positive finding on the throat culture result.  Follow-up if any worsening symptoms.  Patient understands and agrees with the plan.    - Streptococcus A Rapid Screen w/Reflex to PCR - Clinic Collect  - Group A Streptococcus PCR Throat Swab    Acute cough  Acute problem.  On exam patient is in no acute respiratory distress.  Lungs are clear. Vitals are stable.  Offered tessalon perles prescription. Patient declines at this time. Recommended OTC cough medication as needed.  Patient educational information provided regarding course of symptoms.  Advised to keep monitoring symptoms.  Follow-up if any worsening symptoms.  Patient agrees with the plan.       Return in about 1 week (around 12/19/2023) for Symptoms failing to improve.    Adela Juarez PA-C  [facility] CARE [place] Mcd[place] is a 63 year old female who presents to clinic today for the following health issues:  Chief Complaint   Patient presents with    Pharyngitis     Pt reports ST, fever, HA, cough X 4-5 days.      HPI      URI Adult    Onset of symptoms was 3 day(s) ago.  Course of illness is same.    Severity moderate  Current and Associated symptoms: cough, sore throat, HA, subjective fever  Treatment measures tried include Ibuprofen/Tylenol, Throat lozenges.  Predisposing factors include ill contact: Work. 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  No CP/SOB. No v/d.      Review of Systems  Constitutional, HEENT, cardiovascular, pulmonary, GI, , musculoskeletal, neuro, skin, endocrine and psych systems are negative, except as otherwise noted.      Objective    /78 (BP Location: Right arm, Patient Position: Sitting, Cuff Size: Adult Regular)   Pulse 77   Temp 97.4  F (36.3  C) (Tympanic)    Resp 14   SpO2 96%   Physical Exam   GENERAL: healthy, alert and no distress  HENT: ear canals and TM's normal,  mouth without ulcers or lesions, uvula is midline, tonsils are surgically absent  RESP: lungs clear to auscultation - no rales, rhonchi or wheezes  CV: regular rate and rhythm, normal S1 S2  MS: no gross musculoskeletal defects noted, no edema    Results for orders placed or performed in visit on 12/12/23 (from the past 24 hour(s))   Streptococcus A Rapid Screen w/Reflex to PCR - Clinic Collect    Specimen: Throat; Swab   Result Value Ref Range    Group A Strep antigen Negative Negative

## 2023-12-13 LAB — GROUP A STREP BY PCR: NOT DETECTED

## 2024-05-25 ENCOUNTER — HEALTH MAINTENANCE LETTER (OUTPATIENT)
Age: 64
End: 2024-05-25

## 2024-12-21 ENCOUNTER — HEALTH MAINTENANCE LETTER (OUTPATIENT)
Age: 64
End: 2024-12-21

## 2025-03-17 ENCOUNTER — OFFICE VISIT (OUTPATIENT)
Dept: URGENT CARE | Facility: URGENT CARE | Age: 65
End: 2025-03-17
Payer: COMMERCIAL

## 2025-03-17 VITALS
WEIGHT: 205 LBS | HEART RATE: 58 BPM | OXYGEN SATURATION: 99 % | BODY MASS INDEX: 28.59 KG/M2 | RESPIRATION RATE: 16 BRPM | TEMPERATURE: 97.4 F | DIASTOLIC BLOOD PRESSURE: 90 MMHG | SYSTOLIC BLOOD PRESSURE: 148 MMHG

## 2025-03-17 DIAGNOSIS — H93.8X3 EAR CONGESTION, BILATERAL: Primary | ICD-10-CM

## 2025-03-17 PROCEDURE — 99213 OFFICE O/P EST LOW 20 MIN: CPT | Performed by: FAMILY MEDICINE

## 2025-03-17 PROCEDURE — 3077F SYST BP >= 140 MM HG: CPT | Performed by: FAMILY MEDICINE

## 2025-03-17 PROCEDURE — 3080F DIAST BP >= 90 MM HG: CPT | Performed by: FAMILY MEDICINE

## 2025-03-17 NOTE — PATIENT INSTRUCTIONS
Fluticasone nasal steroid spray use once in the morning and once at night in each nostril      Sudafed use for a few days as a decongestant      Tylenol every 4 hours for mild discomfort as needed      Get in to see ENT specialist if symptoms not improving in next few days

## 2025-03-17 NOTE — PROGRESS NOTES
Assessment & Plan     Ear congestion, bilateral     No suggestion of acute otitis media presents with possible eustachian tube dysfunction right worse than the left recommending nasal steroid spray twice daily along with oral decongestant. Follow-up with ENT in a week if not improving    Eduardo Downey MD   [place] [facility]    Isela Mcdowell is a 64 year old female who presents to clinic today for the following health issues:  Chief Complaint   Patient presents with    Otalgia     1.5 week, both ears, plugged     HPI    Patient with feeling of plugged ears for about a week and a half no significant sinus pressure or drainage this has improved overall no issues with allergies yet this year.  No fevers.  No drainage from the ear canals.  No fevers      Patient Active Problem List    Diagnosis Date Noted    Renal colic 07/27/2022     Priority: Medium    Ureteral stone 07/27/2022     Priority: Medium    Acquired hydronephrosis due to obstruction of ureteropelvic junction (UPJ) 07/27/2022     Priority: Medium    Urolithiasis 09/22/2016     Priority: Medium    Occipital mass 01/28/2015     Priority: Medium    CARDIOVASCULAR SCREENING; LDL GOAL LESS THAN 160 04/25/2013     Priority: Medium    Hyperlipidemia LDL goal <130      Priority: Medium       Current Outpatient Medications   Medication Sig Dispense Refill    Calcium-Magnesium (SHARMILA/MAG PO) Take 1 teaspoonful by mouth daily (Patient not taking: Reported on 3/17/2025)      potassium citrate (UROCIT-K) 10 MEQ (1080 MG) CR tablet Take 1 tablet (10 mEq) by mouth 2 times daily (with meals) (Patient not taking: Reported on 3/17/2025) 60 tablet 11     No current facility-administered medications for this visit.         Objective    BP (!) 148/90 (BP Location: Right arm, Patient Position: Sitting, Cuff Size: Adult Large)   Pulse 58   Temp 97.4  F (36.3  C) (Tympanic)   Resp 16   Wt 93 kg (205 lb)   SpO2 99%   BMI 28.59 kg/m    Physical Exam   As noted above  and including:     Tympanic membranes bilaterally gray and dull landmarks visualized no redness or bulging no perforation  No results found for any visits on 03/17/25.          The use of [employer] dictation services may have been used to construct the content in this note; any grammatical or spelling errors are non-intentional. Please contact the author of this note directly if you are in need of any clarification.

## 2025-06-16 ENCOUNTER — LAB (OUTPATIENT)
Dept: LAB | Facility: CLINIC | Age: 65
End: 2025-06-16
Payer: COMMERCIAL

## 2025-06-16 ENCOUNTER — HOSPITAL ENCOUNTER (EMERGENCY)
Facility: CLINIC | Age: 65
Discharge: HOME | End: 2025-06-16
Attending: EMERGENCY MEDICINE | Admitting: EMERGENCY MEDICINE
Payer: COMMERCIAL

## 2025-06-16 ENCOUNTER — APPOINTMENT (OUTPATIENT)
Dept: CT IMAGING | Facility: CLINIC | Age: 65
End: 2025-06-16
Attending: EMERGENCY MEDICINE
Payer: COMMERCIAL

## 2025-06-16 ENCOUNTER — TELEPHONE (OUTPATIENT)
Dept: UROLOGY | Facility: CLINIC | Age: 65
End: 2025-06-16

## 2025-06-16 VITALS
RESPIRATION RATE: 16 BRPM | BODY MASS INDEX: 28.31 KG/M2 | OXYGEN SATURATION: 100 % | WEIGHT: 203 LBS | TEMPERATURE: 97.7 F | SYSTOLIC BLOOD PRESSURE: 156 MMHG | HEART RATE: 70 BPM | DIASTOLIC BLOOD PRESSURE: 87 MMHG

## 2025-06-16 DIAGNOSIS — R10.9 RIGHT FLANK PAIN: ICD-10-CM

## 2025-06-16 DIAGNOSIS — R39.89 SUSPECTED UTI: Primary | ICD-10-CM

## 2025-06-16 DIAGNOSIS — E04.1 THYROID NODULE: ICD-10-CM

## 2025-06-16 DIAGNOSIS — R50.9 FEVER IN ADULT: ICD-10-CM

## 2025-06-16 DIAGNOSIS — R39.89 SUSPECTED UTI: ICD-10-CM

## 2025-06-16 LAB
ALBUMIN SERPL BCG-MCNC: 3.9 G/DL (ref 3.5–5.2)
ALBUMIN UR-MCNC: NEGATIVE MG/DL
ALBUMIN UR-MCNC: NEGATIVE MG/DL
ALP SERPL-CCNC: 54 U/L (ref 40–150)
ALT SERPL W P-5'-P-CCNC: 34 U/L (ref 0–50)
ANION GAP SERPL CALCULATED.3IONS-SCNC: 11 MMOL/L (ref 7–15)
APPEARANCE UR: CLEAR
APPEARANCE UR: CLEAR
AST SERPL W P-5'-P-CCNC: 29 U/L (ref 0–45)
BASOPHILS # BLD MANUAL: 0 10E3/UL (ref 0–0.2)
BASOPHILS NFR BLD MANUAL: 0 %
BILIRUB SERPL-MCNC: 0.6 MG/DL
BILIRUB UR QL STRIP: NEGATIVE
BILIRUB UR QL STRIP: NEGATIVE
BUN SERPL-MCNC: 7.7 MG/DL (ref 8–23)
CALCIUM SERPL-MCNC: 8.5 MG/DL (ref 8.8–10.4)
CHLORIDE SERPL-SCNC: 105 MMOL/L (ref 98–107)
COLOR UR AUTO: NORMAL
COLOR UR AUTO: YELLOW
CREAT SERPL-MCNC: 0.83 MG/DL (ref 0.51–0.95)
DACRYOCYTES BLD QL SMEAR: SLIGHT
EGFRCR SERPLBLD CKD-EPI 2021: 78 ML/MIN/1.73M2
ELLIPTOCYTES BLD QL SMEAR: SLIGHT
EOSINOPHIL # BLD MANUAL: 0.1 10E3/UL (ref 0–0.7)
EOSINOPHIL NFR BLD MANUAL: 2 %
ERYTHROCYTE [DISTWIDTH] IN BLOOD BY AUTOMATED COUNT: 12.2 % (ref 10–15)
FLUAV RNA SPEC QL NAA+PROBE: NEGATIVE
FLUBV RNA RESP QL NAA+PROBE: NEGATIVE
GLUCOSE SERPL-MCNC: 92 MG/DL (ref 70–99)
GLUCOSE UR STRIP-MCNC: NEGATIVE MG/DL
GLUCOSE UR STRIP-MCNC: NEGATIVE MG/DL
HCO3 SERPL-SCNC: 25 MMOL/L (ref 22–29)
HCT VFR BLD AUTO: 33.9 % (ref 35–47)
HGB BLD-MCNC: 11.7 G/DL (ref 11.7–15.7)
HGB UR QL STRIP: ABNORMAL
HGB UR QL STRIP: NEGATIVE
HOLD SPECIMEN: NORMAL
KETONES UR STRIP-MCNC: NEGATIVE MG/DL
KETONES UR STRIP-MCNC: NEGATIVE MG/DL
LEUKOCYTE ESTERASE UR QL STRIP: NEGATIVE
LEUKOCYTE ESTERASE UR QL STRIP: NEGATIVE
LYMPHOCYTES # BLD MANUAL: 0.7 10E3/UL (ref 0.8–5.3)
LYMPHOCYTES NFR BLD MANUAL: 16 %
MCH RBC QN AUTO: 31.3 PG (ref 26.5–33)
MCHC RBC AUTO-ENTMCNC: 34.5 G/DL (ref 31.5–36.5)
MCV RBC AUTO: 91 FL (ref 78–100)
MONOCYTES # BLD MANUAL: 0.2 10E3/UL (ref 0–1.3)
MONOCYTES NFR BLD MANUAL: 4 %
MYELOCYTES # BLD MANUAL: 0.1 10E3/UL
MYELOCYTES NFR BLD MANUAL: 2 %
NEUTROPHILS # BLD MANUAL: 3.3 10E3/UL (ref 1.6–8.3)
NEUTROPHILS NFR BLD MANUAL: 76 %
NITRATE UR QL: NEGATIVE
NITRATE UR QL: NEGATIVE
PH UR STRIP: 7 [PH] (ref 5–7)
PH UR STRIP: 7 [PH] (ref 5–7)
PLAT MORPH BLD: ABNORMAL
PLATELET # BLD AUTO: 236 10E3/UL (ref 150–450)
POTASSIUM SERPL-SCNC: 3.7 MMOL/L (ref 3.4–5.3)
PROT SERPL-MCNC: 6.4 G/DL (ref 6.4–8.3)
RBC # BLD AUTO: 3.74 10E6/UL (ref 3.8–5.2)
RBC MORPH BLD: ABNORMAL
RBC URINE: 1 /HPF
RSV RNA SPEC NAA+PROBE: NEGATIVE
SARS-COV-2 RNA RESP QL NAA+PROBE: NEGATIVE
SMUDGE CELLS BLD QL SMEAR: PRESENT
SODIUM SERPL-SCNC: 141 MMOL/L (ref 135–145)
SP GR UR STRIP: 1 (ref 1–1.03)
SP GR UR STRIP: 1.01 (ref 1–1.03)
UROBILINOGEN UR STRIP-ACNC: 0.2 E.U./DL
UROBILINOGEN UR STRIP-MCNC: NORMAL MG/DL
WBC # BLD AUTO: 4.3 10E3/UL (ref 4–11)
WBC URINE: <1 /HPF

## 2025-06-16 PROCEDURE — 81003 URINALYSIS AUTO W/O SCOPE: CPT | Performed by: EMERGENCY MEDICINE

## 2025-06-16 PROCEDURE — 87637 SARSCOV2&INF A&B&RSV AMP PRB: CPT | Performed by: EMERGENCY MEDICINE

## 2025-06-16 PROCEDURE — 80053 COMPREHEN METABOLIC PANEL: CPT | Performed by: EMERGENCY MEDICINE

## 2025-06-16 PROCEDURE — 86618 LYME DISEASE ANTIBODY: CPT | Performed by: EMERGENCY MEDICINE

## 2025-06-16 PROCEDURE — 99284 EMERGENCY DEPT VISIT MOD MDM: CPT | Mod: 25

## 2025-06-16 PROCEDURE — 36415 COLL VENOUS BLD VENIPUNCTURE: CPT | Performed by: EMERGENCY MEDICINE

## 2025-06-16 PROCEDURE — 85007 BL SMEAR W/DIFF WBC COUNT: CPT | Performed by: EMERGENCY MEDICINE

## 2025-06-16 PROCEDURE — 74176 CT ABD & PELVIS W/O CONTRAST: CPT

## 2025-06-16 PROCEDURE — 85014 HEMATOCRIT: CPT | Performed by: EMERGENCY MEDICINE

## 2025-06-16 PROCEDURE — 87086 URINE CULTURE/COLONY COUNT: CPT

## 2025-06-16 PROCEDURE — 87040 BLOOD CULTURE FOR BACTERIA: CPT | Performed by: EMERGENCY MEDICINE

## 2025-06-16 ASSESSMENT — ACTIVITIES OF DAILY LIVING (ADL)
ADLS_ACUITY_SCORE: 49
ADLS_ACUITY_SCORE: 49

## 2025-06-16 ASSESSMENT — COLUMBIA-SUICIDE SEVERITY RATING SCALE - C-SSRS
6. HAVE YOU EVER DONE ANYTHING, STARTED TO DO ANYTHING, OR PREPARED TO DO ANYTHING TO END YOUR LIFE?: NO
2. HAVE YOU ACTUALLY HAD ANY THOUGHTS OF KILLING YOURSELF IN THE PAST MONTH?: NO
1. IN THE PAST MONTH, HAVE YOU WISHED YOU WERE DEAD OR WISHED YOU COULD GO TO SLEEP AND NOT WAKE UP?: NO

## 2025-06-16 NOTE — ED PROVIDER NOTES
Emergency Department Note      History of Present Illness     Chief Complaint   Flank Pain  Fever    HPI   July Wolf is a 64 year old female with a history of kidney stones and hyperlipidemia who presents to the ED for fever and flank pain. The patient reports that she initially started feeling lower abdominal pain a few months ago. The pain was initially dull, but as of a month ago, the pain moved towards the right abdomen and lower right back. Over the last few days the patient has had a low grade fever measuring 101, generalized aches and pains, swelling in the legs, hands, feet, and eyes. The patient's eyes have been causing her a lot more discomfort as of the last few days. The patient also reports a loss of appetite, diarrhea, and urinary frequency. The patient notes that her history of 5 kidney stones leads her to believe that this may be a similar situation. The patient has never been able to pass kidney stone alone. The patient had a urinalysis performed at 1130. The patient denies rash, vomiting, runny nose, congestion, cough, diarrhea, and loss of appetite. The patient also denies recent travel.     Independent Historian   None    Review of External Notes   Care Everywhere reviewed in epic updated    Past Medical History     Medical History and Problem List   Past Medical History:   Diagnosis Date    Hyperlipidemia     Kidney stone      Medications   No current outpatient medications on file.    Surgical History   Past Surgical History:   Procedure Laterality Date    BLEPHAROPLASTY      COLONOSCOPY  03/20/2014    Procedure: COLONOSCOPY;  Colonoscopy;  Surgeon: Satya Augustin MD;  Location:  GI    COMBINED CYSTOSCOPY, RETROGRADES, URETEROSCOPY, INSERT STENT Left 09/22/2016    Procedure: COMBINED CYSTOSCOPY, RETROGRADES, URETEROSCOPY, INSERT STENT;  Surgeon: Eduardo Khalil MD;  Location: [facility] OR    HYSTERECTOMY, PAP NO LONGER INDICATED  1991    endometriosis, 3 c sections    LASER HOLMIUM  LITHOTRIPSY URETER(S), INSERT STENT, COMBINED Left 07/27/2022    Procedure: Cystoscopy, left retrograde pyelogram, interpretation of fluoroscopic images, left ureteroscopy with thulium laser lithotripsy and stone basketing, placement of 5 x 26 double-J ureteral stent;  Surgeon: Miguel Harris MD;  Location: [facility] OR    TONSILLECTOMY         Physical Exam     Patient Vitals for the past 24 hrs:   BP Temp Temp src Pulse Resp SpO2 Weight   06/16/25 1204  156/87 97.7  F (36.5  C) Axillary 70 16 100 % 92.1 kg (203 lb)     Physical Exam  Nursing note and vitals reviewed.  Constitutional: Cooperative.  Sitting up comfortably  HENT:   Mouth/Throat: Mucous membranes are normal.   Eyes: No discharge  Cardiovascular: Normal rate, regular rhythm and normal heart sounds.  No murmur.  Pulmonary/Chest: Effort normal and breath sounds normal. No respiratory distress. No wheezes. No rales.   Abdominal: Soft. Normal appearance. No distension. There is no tenderness. There is no rigidity and no guarding.   Neurological: Alert.  Oriented x 3  Skin: Skin is warm and dry. No rash noted.   Psychiatric: Normal mood and affect.      Diagnostics     Lab Results   Labs Ordered and Resulted from Time of ED Arrival to Time of ED Departure   COMPREHENSIVE METABOLIC PANEL - Abnormal       Result Value    Sodium 141      Potassium 3.7      Carbon Dioxide (CO2) 25      Anion Gap 11      Urea Nitrogen 7.7 (*)     Creatinine 0.83      GFR Estimate 78      Calcium 8.5 (*)     Chloride 105      Glucose 92      Alkaline Phosphatase 54      AST 29      ALT 34      Protein Total 6.4      Albumin 3.9      Bilirubin Total 0.6     CBC WITH PLATELETS AND DIFFERENTIAL - Abnormal    WBC Count 4.3      RBC Count 3.74 (*)     Hemoglobin 11.7      Hematocrit 33.9 (*)     MCV 91      MCH 31.3      MCHC 34.5      RDW 12.2      Platelet Count 236     MANUAL DIFFERENTIAL - Abnormal    % Neutrophils 76      % Lymphocytes 16      % Monocytes 4      % Eosinophils 2       % Basophils 0      % Myelocytes 2      Absolute Neutrophils 3.3      Absolute Lymphocytes 0.7 (*)     Absolute Monocytes 0.2      Absolute Eosinophils 0.1      Absolute Basophils 0.0      Absolute Myelocytes 0.1 (*)    RBC AND PLATELET MORPHOLOGY - Abnormal    RBC Morphology Confirmed RBC Indices      Platelet Assessment        Value: Automated Count Confirmed. Platelet morphology is normal.    Elliptocytes Slight (*)     Smudge Cells Present (*)     Teardrop Cells Slight (*)    ROUTINE UA WITH MICROSCOPIC REFLEX TO CULTURE - Normal    Color Urine Straw      Appearance Urine Clear      Glucose Urine Negative      Bilirubin Urine Negative      Ketones Urine Negative      Specific Gravity Urine 1.003      Blood Urine Negative      pH Urine 7.0      Protein Albumin Urine Negative      Urobilinogen Urine Normal      Nitrite Urine Negative      Leukocyte Esterase Urine Negative      RBC Urine 1      WBC Urine <1     INFLUENZA A/B, RSV AND SARS-COV2 PCR - Normal    Influenza A PCR Negative      Influenza B PCR Negative      RSV PCR Negative      SARS CoV2 PCR Negative     LYME DISEASE TOTAL ANTIBODIES WITH REFLEX TO CONFIRMATION: Pending   BLOOD CULTURE: Pending   BLOOD CULTURE: Pending     Imaging   CT Chest Abdomen Pelvis w/o Contrast   Final Result   IMPRESSION:   1.  No acute abnormalities in the chest, abdomen, or pelvis.   2.  A 1-2 mm nonobstructing left intrarenal calculus. No right urinary calculi.   3.  A 1.5 cm density at the anterior midline neck is potentially an exophytic thyroid nodule. Follow-up nonemergent thyroid ultrasound is recommended.   4.  Mild splenic enlargement.           Independent Interpretation   None    ED Course      Medications Administered   Medications - No data to display    Discussion of Management   None    ED Course   ED Course as of 06/16/25 1611   Mon Jun 16, 2025   1408 I obtained history and examined the patient as noted above.    1540 I rechecked the patient for discharge.       Additional Documentation  None    Medical Decision Making / Diagnosis     [facility]   July Wolf is a 64 year old female who presents with progressive right-sided flank pain and a history of kidney stones.  No identifiable obstructing stone noted on imaging.  There is an incidental left renal stone.  Remainder of the imaging was reassuring.  Given her reported fevers I did extend the scan up to her chest but no pneumonia or infiltrate.  Incidental thyroid nodule identified and patient updated with plans to follow-up with primary care.  Cause of her reported fever is unclear.  No leukocytosis.  No physical exam findings that would point in a direction of more clear source of infection.  We did send blood cultures and a Lyme screening serum study.  These can be followed up on an outpatient basis.  Patient is comfortable with this discussion and will be discharged.  Fever precautions provided at discharge    Disposition   The patient was discharged.     Diagnosis     ICD-10-CM    1. Right flank pain  R10.9       2. Fever in adult  R50.9       3. Thyroid nodule - incidental  E04.1          Scribe Disclosure:  I, Kai Ruiz, am serving as a scribe at 3:37 PM on 6/16/2025 to document services personally performed by Je Calderon MD based on my observations and the provider's statements to me.        Je Calderon MD  06/16/25 [id number]

## 2025-06-16 NOTE — ED TRIAGE NOTES
Pt comes in with concerns of kidney stones. Hx of stones. Has right flank abd pain, low fevers, nausea. Called urology clinic today due to these sx and she has followed with them in the past and they recommended UA and CT. States her pain is improved today, but feels similar to previous stones. All previous stones needed surgery and stents.

## 2025-06-16 NOTE — TELEPHONE ENCOUNTER
I called pt in response to Call Center message.  Patient reports that she had kidney stone like pain about 3 weeks ago. Not in flank, but in abdomen (where she felt it last time she had a stone), at first pain with medial then localized to Right abdomen.  She has not seen a stone pass. She is having frequency and urgency, she believes she has a UTI.  The mild irritation in eyes, hands, and feet: she thinks it is from systemic inflammation possibly due to infected kidney stone.   Ordered UA/UC and warm transferred to scheduling, so she can schedule an annual visit with Pamella GRADY

## 2025-06-16 NOTE — TELEPHONE ENCOUNTER
[facility] Call Center    Phone Message    May a detailed message be left on voicemail: no     Reason for Call: Symptoms or Concerns     If patient has red-flag symptoms, warm transfer to triage line    Current symptom or concern: Low grade fever, inflammation in hands,feet, eyes, frequent urination.    Symptoms have been present for:  about 1 month(s) , but the inflammation is more recent.    Has patient previously been seen for this? Yes    By Pamella Adler    Date: 09/28/2022    Are there any new or worsening symptoms? Yes: Pt is calling and is concerned for a kidney infection. Pt would like advice on what to do.    Action Taken: Other: [facility] - Urology    Travel Screening: Not Applicable     Date of Service:

## 2025-06-17 LAB
B BURGDOR IGG+IGM SER QL: 0.63
BACTERIA UR CULT: NORMAL

## 2025-06-18 ENCOUNTER — RESULTS FOLLOW-UP (OUTPATIENT)
Dept: UROLOGY | Facility: CLINIC | Age: 65
End: 2025-06-18

## 2025-06-19 LAB
BACTERIA SPEC CULT: NORMAL
BACTERIA SPEC CULT: NORMAL

## 2025-06-21 LAB
BACTERIA SPEC CULT: NO GROWTH
BACTERIA SPEC CULT: NO GROWTH

## (undated) DEVICE — GLOVE PROTEXIS POWDER FREE SMT 7.5  2D72PT75X

## (undated) DEVICE — BASKET NITINOL TIPLESS HALO  1.5FRX120CM 554120

## (undated) DEVICE — LINEN HALF SHEET 5512

## (undated) DEVICE — KIT ENDO FIRST STEP DISINFECTANT 200ML W/POUCH EP-4

## (undated) DEVICE — TUBING IRRIG TUR Y TYPE 96" LF 6543-01

## (undated) DEVICE — COVER FOOTSWITCH W/CINCH 20X24" 923267

## (undated) DEVICE — SOL NACL 0.9% IRRIG 3000ML BAG 2B7477

## (undated) DEVICE — LINEN FULL SHEET 5511

## (undated) DEVICE — SOL WATER IRRIG 1000ML BOTTLE 2F7114

## (undated) DEVICE — BAG CLEAR TRASH 1.3M 39X33" P4040C

## (undated) DEVICE — RAD RX ISOVUE 300 (50ML) 61% IOPAMIDOL CHARGE PER ML

## (undated) DEVICE — PACK CYSTO CUSTOM RIDGES

## (undated) DEVICE — FIBER LASER THULIUM 365 UM DISPOSABLE TFL-FBX365S

## (undated) DEVICE — PREP CHLORHEXIDINE 4% 4OZ (HIBICLENS) 57504

## (undated) DEVICE — BASKET RETRIEVAL 1.9FRX120CM ESCAPE NTNL 4 WIRE 390-201

## (undated) DEVICE — GUIDEWIRE URO STR STIFF .035"X150CM NITINOL 150NSS35

## (undated) DEVICE — CATH URETERAL FLEX TIP TIGERTAIL 06FRX70CM 139006

## (undated) RX ORDER — FENTANYL CITRATE 50 UG/ML
INJECTION, SOLUTION INTRAMUSCULAR; INTRAVENOUS
Status: DISPENSED
Start: 2022-07-27

## (undated) RX ORDER — CEFAZOLIN SODIUM/WATER 2 G/20 ML
SYRINGE (ML) INTRAVENOUS
Status: DISPENSED
Start: 2022-07-27

## (undated) RX ORDER — ATROPA BELLADONNA AND OPIUM 16.2; 3 MG/1; MG/1
SUPPOSITORY RECTAL
Status: DISPENSED
Start: 2022-07-27